# Patient Record
Sex: MALE | Race: ASIAN | NOT HISPANIC OR LATINO | Employment: FULL TIME | URBAN - METROPOLITAN AREA
[De-identification: names, ages, dates, MRNs, and addresses within clinical notes are randomized per-mention and may not be internally consistent; named-entity substitution may affect disease eponyms.]

---

## 2021-04-28 ENCOUNTER — APPOINTMENT (OUTPATIENT)
Dept: URGENT CARE | Facility: CLINIC | Age: 46
End: 2021-04-28

## 2021-04-28 DIAGNOSIS — Z02.1 PRE-EMPLOYMENT EXAMINATION: Primary | ICD-10-CM

## 2021-04-28 LAB — RUBV IGG SERPL IA-ACNC: 42.5 IU/ML

## 2021-04-28 PROCEDURE — 86765 RUBEOLA ANTIBODY: CPT | Performed by: FAMILY MEDICINE

## 2021-04-28 PROCEDURE — 86787 VARICELLA-ZOSTER ANTIBODY: CPT | Performed by: FAMILY MEDICINE

## 2021-04-28 PROCEDURE — 86762 RUBELLA ANTIBODY: CPT | Performed by: FAMILY MEDICINE

## 2021-04-28 PROCEDURE — 86735 MUMPS ANTIBODY: CPT | Performed by: FAMILY MEDICINE

## 2021-04-28 PROCEDURE — 86480 TB TEST CELL IMMUN MEASURE: CPT | Performed by: FAMILY MEDICINE

## 2021-04-29 LAB
MEV IGG SER QL: NORMAL
MUV IGG SER QL: NORMAL
VZV IGG SER IA-ACNC: NORMAL

## 2021-04-30 LAB
GAMMA INTERFERON BACKGROUND BLD IA-ACNC: 0.03 IU/ML
M TB IFN-G BLD-IMP: NEGATIVE
M TB IFN-G CD4+ BCKGRND COR BLD-ACNC: -0.01 IU/ML
M TB IFN-G CD4+ BCKGRND COR BLD-ACNC: 0 IU/ML
MITOGEN IGNF BCKGRD COR BLD-ACNC: >10 IU/ML

## 2021-09-10 ENCOUNTER — OFFICE VISIT (OUTPATIENT)
Dept: FAMILY MEDICINE CLINIC | Facility: HOSPITAL | Age: 46
End: 2021-09-10
Payer: COMMERCIAL

## 2021-09-10 ENCOUNTER — LAB (OUTPATIENT)
Dept: LAB | Facility: HOSPITAL | Age: 46
End: 2021-09-10
Payer: COMMERCIAL

## 2021-09-10 VITALS
HEIGHT: 71 IN | SYSTOLIC BLOOD PRESSURE: 118 MMHG | TEMPERATURE: 97.6 F | HEART RATE: 66 BPM | DIASTOLIC BLOOD PRESSURE: 80 MMHG | BODY MASS INDEX: 36.96 KG/M2 | WEIGHT: 264 LBS

## 2021-09-10 DIAGNOSIS — R73.03 PREDIABETES: ICD-10-CM

## 2021-09-10 DIAGNOSIS — M10.9 GOUT, UNSPECIFIED CAUSE, UNSPECIFIED CHRONICITY, UNSPECIFIED SITE: ICD-10-CM

## 2021-09-10 DIAGNOSIS — G47.33 OSA (OBSTRUCTIVE SLEEP APNEA): ICD-10-CM

## 2021-09-10 DIAGNOSIS — E78.00 HYPERCHOLESTEROLEMIA: ICD-10-CM

## 2021-09-10 DIAGNOSIS — Z00.00 ANNUAL PHYSICAL EXAM: Primary | ICD-10-CM

## 2021-09-10 DIAGNOSIS — Z23 ENCOUNTER FOR IMMUNIZATION: ICD-10-CM

## 2021-09-10 DIAGNOSIS — E66.01 CLASS 2 SEVERE OBESITY DUE TO EXCESS CALORIES WITH SERIOUS COMORBIDITY AND BODY MASS INDEX (BMI) OF 36.0 TO 36.9 IN ADULT (HCC): ICD-10-CM

## 2021-09-10 DIAGNOSIS — Z12.11 SCREEN FOR COLON CANCER: ICD-10-CM

## 2021-09-10 PROBLEM — I49.1 PREMATURE ATRIAL CONTRACTIONS: Status: ACTIVE | Noted: 2021-09-10

## 2021-09-10 PROBLEM — J30.2 SEASONAL ALLERGIES: Status: ACTIVE | Noted: 2021-09-10

## 2021-09-10 LAB
ALBUMIN SERPL BCP-MCNC: 3.9 G/DL (ref 3.5–5)
ALP SERPL-CCNC: 54 U/L (ref 46–116)
ALT SERPL W P-5'-P-CCNC: 58 U/L (ref 12–78)
ANION GAP SERPL CALCULATED.3IONS-SCNC: 3 MMOL/L (ref 4–13)
AST SERPL W P-5'-P-CCNC: 24 U/L (ref 5–45)
BILIRUB SERPL-MCNC: 0.71 MG/DL (ref 0.2–1)
BUN SERPL-MCNC: 16 MG/DL (ref 5–25)
CALCIUM SERPL-MCNC: 9.2 MG/DL (ref 8.3–10.1)
CHLORIDE SERPL-SCNC: 106 MMOL/L (ref 100–108)
CHOLEST SERPL-MCNC: 184 MG/DL (ref 50–200)
CO2 SERPL-SCNC: 31 MMOL/L (ref 21–32)
CREAT SERPL-MCNC: 1.19 MG/DL (ref 0.6–1.3)
ERYTHROCYTE [DISTWIDTH] IN BLOOD BY AUTOMATED COUNT: 12.4 % (ref 11.6–15.1)
EST. AVERAGE GLUCOSE BLD GHB EST-MCNC: 137 MG/DL
GFR SERPL CREATININE-BSD FRML MDRD: 72 ML/MIN/1.73SQ M
GLUCOSE P FAST SERPL-MCNC: 128 MG/DL (ref 65–99)
HBA1C MFR BLD: 6.4 %
HCT VFR BLD AUTO: 52.1 % (ref 36.5–49.3)
HDLC SERPL-MCNC: 38 MG/DL
HGB BLD-MCNC: 17.2 G/DL (ref 12–17)
LDLC SERPL CALC-MCNC: 88 MG/DL (ref 0–100)
MAGNESIUM SERPL-MCNC: 2.7 MG/DL (ref 1.6–2.6)
MCH RBC QN AUTO: 31.2 PG (ref 26.8–34.3)
MCHC RBC AUTO-ENTMCNC: 33 G/DL (ref 31.4–37.4)
MCV RBC AUTO: 94 FL (ref 82–98)
PLATELET # BLD AUTO: 281 THOUSANDS/UL (ref 149–390)
PMV BLD AUTO: 10 FL (ref 8.9–12.7)
POTASSIUM SERPL-SCNC: 4.3 MMOL/L (ref 3.5–5.3)
PROT SERPL-MCNC: 7.6 G/DL (ref 6.4–8.2)
RBC # BLD AUTO: 5.52 MILLION/UL (ref 3.88–5.62)
SODIUM SERPL-SCNC: 140 MMOL/L (ref 136–145)
TRIGL SERPL-MCNC: 290 MG/DL
TSH SERPL DL<=0.05 MIU/L-ACNC: 2.38 UIU/ML (ref 0.36–3.74)
URATE SERPL-MCNC: 6.7 MG/DL (ref 4.2–8)
WBC # BLD AUTO: 9.15 THOUSAND/UL (ref 4.31–10.16)

## 2021-09-10 PROCEDURE — 84550 ASSAY OF BLOOD/URIC ACID: CPT

## 2021-09-10 PROCEDURE — 83735 ASSAY OF MAGNESIUM: CPT

## 2021-09-10 PROCEDURE — 85027 COMPLETE CBC AUTOMATED: CPT

## 2021-09-10 PROCEDURE — 84443 ASSAY THYROID STIM HORMONE: CPT

## 2021-09-10 PROCEDURE — 90686 IIV4 VACC NO PRSV 0.5 ML IM: CPT

## 2021-09-10 PROCEDURE — 99396 PREV VISIT EST AGE 40-64: CPT | Performed by: FAMILY MEDICINE

## 2021-09-10 PROCEDURE — 80053 COMPREHEN METABOLIC PANEL: CPT

## 2021-09-10 PROCEDURE — 36415 COLL VENOUS BLD VENIPUNCTURE: CPT

## 2021-09-10 PROCEDURE — 83036 HEMOGLOBIN GLYCOSYLATED A1C: CPT

## 2021-09-10 PROCEDURE — 90471 IMMUNIZATION ADMIN: CPT

## 2021-09-10 PROCEDURE — 80061 LIPID PANEL: CPT

## 2021-09-10 RX ORDER — FLUTICASONE PROPIONATE 50 MCG
SPRAY, SUSPENSION (ML) NASAL
COMMUNITY

## 2021-09-10 RX ORDER — ALLOPURINOL 100 MG/1
TABLET ORAL
COMMUNITY
End: 2021-09-15

## 2021-09-10 RX ORDER — ATORVASTATIN CALCIUM 10 MG/1
TABLET, FILM COATED ORAL
COMMUNITY
End: 2021-09-15

## 2021-09-10 RX ORDER — CETIRIZINE HYDROCHLORIDE 10 MG/1
TABLET ORAL
COMMUNITY

## 2021-09-10 RX ORDER — PREDNISONE 10 MG/1
TABLET ORAL
Qty: 48 TABLET | Refills: 1 | Status: SHIPPED | OUTPATIENT
Start: 2021-09-10 | End: 2022-01-26

## 2021-09-10 NOTE — PATIENT INSTRUCTIONS

## 2021-09-10 NOTE — PROGRESS NOTES
[facility] [address]     NAME: Porter Boss  AGE: 48 y o  SEX: male  : 1975     DATE: 9/10/2021     Assessment and Plan:     Problem List Items Addressed This Visit        Other    Gout    Relevant Medications    predniSONE 10 mg tablet    Other Relevant Orders    Uric acid    TSH, 3rd generation with Free T4 reflex    Hypercholesterolemia    Relevant Medications    atorvastatin (LIPITOR) 10 mg tablet    Other Relevant Orders    Lipid Panel with Direct LDL reflex    Magnesium    TSH, 3rd generation with Free T4 reflex      Other Visit Diagnoses     Annual physical exam    -  Primary    RADHA (obstructive sleep apnea)        Prediabetes        Relevant Orders    CBC    Comprehensive metabolic panel    Hemoglobin A1C    Class 2 severe obesity due to excess calories with serious comorbidity and body mass index (BMI) of 36 0 to 36 9 in [facility])          Obstructive sleep apnea  Currently on CPAP at 12 cm  He will be reaching out to sleep specialist for ongoing care  Hyperlipidemia  On Lipitor  Check labs and adjust if needed  Gout occasional flare-ups  Check uric acid  Continue with NSAIDs as needed  Pre diabetes  Check labs  Work on dietary control and exercise  Immunizations and preventive care screenings were discussed with patient today  Appropriate education was printed on patient's after visit summary  Counseling:  Alcohol/drug use: discussed moderation in alcohol intake, the recommendations for healthy alcohol use, and avoidance of illicit drug use  Dental Health: discussed importance of regular tooth brushing, flossing, and dental visits  · Exercise: the importance of regular exercise/physical activity was discussed  Recommend exercise 3-5 times per week for at least 30 minutes  BMI Counseling: Body mass index is 36 82 kg/m²   The BMI is above normal  Nutrition recommendations include decreasing portion sizes, encouraging healthy choices of fruits and vegetables, decreasing fast food intake, moderation in carbohydrate intake and increasing intake of lean protein  Exercise recommendations include moderate physical activity 150 minutes/week and exercising 3-5 times per week  No follow-ups on file  Chief Complaint:     Chief Complaint   Patient presents with   [facility] Annual Exam      History of Present Illness:     Adult Annual Physical   Patient here for a comprehensive physical exam  The patient reports with gout, sravan, ifg, hld  Has been stable  Has not had recent labs  But feeling well       Diet and Physical Activity  · Diet/Nutrition: well balanced diet  · Exercise: no formal exercise  Depression Screening  PHQ-9 Depression Screening    PHQ-9:   Frequency of the following problems over the past two weeks:      Little interest or pleasure in doing things: 0 - not at all  Feeling down, depressed, or hopeless: 0 - not at all  PHQ-2 Score: 0       General Health  · Sleep: with cpap  · Hearing: normal - bilateral   · Vision: no vision problems  · Dental: regular dental visits   Health  · Symptoms include: none     Review of Systems:     Review of Systems   Constitutional: Negative  Negative for activity change, appetite change, chills and diaphoresis  HENT: Negative for congestion and dental problem  Respiratory: Negative  Negative for apnea, chest tightness, shortness of breath and wheezing  Cardiovascular: Negative  Negative for chest pain, palpitations and leg swelling  Gastrointestinal: Negative  Negative for abdominal distention, abdominal pain, constipation, diarrhea and nausea  Genitourinary: Negative  Negative for difficulty urinating, dysuria and frequency  Past Medical History:     History reviewed  No pertinent past medical history  Past Surgical History:     History reviewed  No pertinent surgical history  Family History:     History reviewed  No pertinent family history  Social History:     Social History     Socioeconomic History    Marital status: /Civil Union     Spouse name: None    Number of children: None    Years of education: None    Highest education level: None   Occupational History    None   Tobacco Use    Smoking status: Never Smoker    Smokeless tobacco: Never Used   Vaping Use    Vaping Use: Never used   Substance and Sexual Activity    Alcohol use: Never    Drug use: Never    Sexual activity: None   Other Topics Concern    None   Social History Narrative    None     Social Determinants of Health     Financial Resource Strain:     Difficulty of Paying Living Expenses:    Food Insecurity:     Worried About Running Out of Food in the Last Year:     Ran Out of Food in the Last Year:    Transportation Needs:     Lack of Transportation (Medical):      Lack of Transportation (Non-Medical):    Physical Activity:     Days of Exercise per Week:     Minutes of Exercise per Session:    Stress:     Feeling of Stress :    Social Connections:     Frequency of Communication with Friends and Family:     Frequency of Social Gatherings with Friends and Family:     Attends Muslim Services:     Active Member of Clubs or Organizations:     Attends Club or Organization Meetings:     Marital Status:    Intimate Partner Violence:     Fear of Current or Ex-Partner:     Emotionally Abused:     Physically Abused:     Sexually Abused:       Current Medications:     Current Outpatient Medications   Medication Sig Dispense Refill    allopurinol (ZYLOPRIM) 100 mg tablet       ASPIRIN 81 PO       atorvastatin (LIPITOR) 10 mg tablet       cetirizine (ZyrTEC Allergy) 10 mg tablet       fluticasone (Flonase) 50 mcg/act nasal spray       Multiple Vitamins-Minerals (MULTIVITAMIN MEN PO)       predniSONE 10 mg tablet Take 6 tab po x 3 d, 4 tab po x 3 d, 3 tab po x 3 d, 2 tab po x 3 d, 1 tab po x 3 d 48 tablet 1     No current facility-administered medications for this visit  Allergies:     No Known Allergies   Physical Exam:     /80   Pulse 66   Temp 97 6 °F (36 4 °C)   Ht 5' 11" (1 803 m)   Wt 120 kg (264 lb)   BMI 36 82 kg/m²     Physical Exam  Vitals and nursing note reviewed  Constitutional:       General: He is not in acute distress  Appearance: Normal appearance  He is well-developed  He is obese  He is not ill-appearing  HENT:      Head: Normocephalic and atraumatic  Right Ear: Tympanic membrane, ear canal and external ear normal       Left Ear: Tympanic membrane, ear canal and external ear normal       Mouth/Throat:      Mouth: Mucous membranes are moist       Pharynx: Oropharynx is clear  Eyes:      Extraocular Movements: Extraocular movements intact  Conjunctiva/sclera: Conjunctivae normal       Pupils: Pupils are equal, round, and reactive to light  Cardiovascular:      Rate and Rhythm: Normal rate and regular rhythm  Heart sounds: Normal heart sounds  No murmur heard  Pulmonary:      Effort: Pulmonary effort is normal       Breath sounds: Normal breath sounds  Abdominal:      General: Bowel sounds are normal  There is no distension  Palpations: Abdomen is soft  There is no mass  Tenderness: There is no abdominal tenderness  There is no guarding  Hernia: No hernia is present  Genitourinary:     Penis: Normal     Musculoskeletal:         General: Normal range of motion  Cervical back: Normal range of motion and neck supple  Skin:     General: Skin is warm and dry  Capillary Refill: Capillary refill takes less than 2 seconds  Neurological:      General: No focal deficit present  Mental Status: He is alert and oriented to person, place, and time  Psychiatric:         Mood and Affect: Mood normal          Behavior: Behavior normal          Thought Content:  Thought content normal          Judgment: Judgment lilliana Rowan MD  [address] [id number]

## 2021-09-13 RX ORDER — ATORVASTATIN CALCIUM 10 MG/1
10 TABLET, FILM COATED ORAL DAILY
Qty: 90 TABLET | Status: CANCELLED | OUTPATIENT
Start: 2021-09-13

## 2021-09-13 RX ORDER — ALLOPURINOL 100 MG/1
100 TABLET ORAL DAILY
Qty: 90 TABLET | Status: CANCELLED | OUTPATIENT
Start: 2021-09-13

## 2021-09-15 DIAGNOSIS — M10.9 GOUT, UNSPECIFIED CAUSE, UNSPECIFIED CHRONICITY, UNSPECIFIED SITE: Primary | ICD-10-CM

## 2021-09-15 DIAGNOSIS — E78.00 HYPERCHOLESTEROLEMIA: ICD-10-CM

## 2021-09-15 RX ORDER — ATORVASTATIN CALCIUM 10 MG/1
10 TABLET, FILM COATED ORAL DAILY
Qty: 90 TABLET | Refills: 3 | Status: SHIPPED | OUTPATIENT
Start: 2021-09-15 | End: 2022-04-06

## 2021-09-15 RX ORDER — ALLOPURINOL 100 MG/1
100 TABLET ORAL DAILY
Qty: 90 TABLET | Refills: 3 | Status: SHIPPED | OUTPATIENT
Start: 2021-09-15 | End: 2022-07-12 | Stop reason: SDUPTHER

## 2021-10-26 LAB — COLOGUARD RESULT REPORTABLE: NEGATIVE

## 2021-12-01 ENCOUNTER — TELEPHONE (OUTPATIENT)
Dept: FAMILY MEDICINE CLINIC | Facility: HOSPITAL | Age: 46
End: 2021-12-01

## 2021-12-01 DIAGNOSIS — B34.9 VIRAL INFECTION, UNSPECIFIED: Primary | ICD-10-CM

## 2021-12-01 PROCEDURE — U0005 INFEC AGEN DETEC AMPLI PROBE: HCPCS | Performed by: FAMILY MEDICINE

## 2021-12-01 PROCEDURE — U0003 INFECTIOUS AGENT DETECTION BY NUCLEIC ACID (DNA OR RNA); SEVERE ACUTE RESPIRATORY SYNDROME CORONAVIRUS 2 (SARS-COV-2) (CORONAVIRUS DISEASE [COVID-19]), AMPLIFIED PROBE TECHNIQUE, MAKING USE OF HIGH THROUGHPUT TECHNOLOGIES AS DESCRIBED BY CMS-2020-01-R: HCPCS | Performed by: FAMILY MEDICINE

## 2021-12-22 ENCOUNTER — IMMUNIZATIONS (OUTPATIENT)
Dept: FAMILY MEDICINE CLINIC | Facility: HOSPITAL | Age: 46
End: 2021-12-22

## 2021-12-22 DIAGNOSIS — Z23 ENCOUNTER FOR IMMUNIZATION: Primary | ICD-10-CM

## 2021-12-22 PROCEDURE — 91300 COVID-19 PFIZER VACC 0.3 ML: CPT

## 2021-12-22 PROCEDURE — 0001A COVID-19 PFIZER VACC 0.3 ML: CPT

## 2022-01-26 ENCOUNTER — PATIENT MESSAGE (OUTPATIENT)
Dept: FAMILY MEDICINE CLINIC | Facility: HOSPITAL | Age: 47
End: 2022-01-26

## 2022-01-26 DIAGNOSIS — M10.9 GOUT, UNSPECIFIED CAUSE, UNSPECIFIED CHRONICITY, UNSPECIFIED SITE: Primary | ICD-10-CM

## 2022-01-26 RX ORDER — PREDNISONE 10 MG/1
TABLET ORAL
Qty: 21 TABLET | Refills: 3 | Status: SHIPPED | OUTPATIENT
Start: 2022-01-26 | End: 2022-07-20 | Stop reason: SDUPTHER

## 2022-01-26 NOTE — TELEPHONE ENCOUNTER
From: Esther Erwin  To: Francesco Powell MD  Sent: 1/26/2022 12:10 PM EST  Subject: gout flareup    Hi! Just an FYI     for an unknown reason I've been experiencing a gout flareup, and have started the prednisone taper (which you provided at my last Clinic visit) along with scheduled Aleve  Has been improving, and no side effects with the meds  Last time I experienced a flareup may have been past spring-summer  At your convenience, would you be able to provide a refill for the prednisone, in preparation for a future flareup (which I'm hoping will never come! )? Thanks!

## 2022-03-11 ENCOUNTER — LAB (OUTPATIENT)
Dept: LAB | Facility: HOSPITAL | Age: 47
End: 2022-03-11
Payer: COMMERCIAL

## 2022-03-11 ENCOUNTER — OFFICE VISIT (OUTPATIENT)
Dept: FAMILY MEDICINE CLINIC | Facility: HOSPITAL | Age: 47
End: 2022-03-11
Payer: COMMERCIAL

## 2022-03-11 VITALS
WEIGHT: 262.4 LBS | HEART RATE: 76 BPM | DIASTOLIC BLOOD PRESSURE: 88 MMHG | HEIGHT: 71 IN | BODY MASS INDEX: 36.73 KG/M2 | SYSTOLIC BLOOD PRESSURE: 118 MMHG | TEMPERATURE: 98 F

## 2022-03-11 DIAGNOSIS — E78.00 HYPERCHOLESTEROLEMIA: ICD-10-CM

## 2022-03-11 DIAGNOSIS — R73.01 IMPAIRED FASTING GLUCOSE: ICD-10-CM

## 2022-03-11 DIAGNOSIS — R73.01 IMPAIRED FASTING GLUCOSE: Primary | ICD-10-CM

## 2022-03-11 DIAGNOSIS — M79.10 MYALGIA: ICD-10-CM

## 2022-03-11 DIAGNOSIS — G47.33 OSA (OBSTRUCTIVE SLEEP APNEA): ICD-10-CM

## 2022-03-11 DIAGNOSIS — M10.9 GOUT, UNSPECIFIED CAUSE, UNSPECIFIED CHRONICITY, UNSPECIFIED SITE: ICD-10-CM

## 2022-03-11 LAB
ALBUMIN SERPL BCP-MCNC: 4.3 G/DL (ref 3.5–5)
ALP SERPL-CCNC: 57 U/L (ref 46–116)
ALT SERPL W P-5'-P-CCNC: 75 U/L (ref 12–78)
ANION GAP SERPL CALCULATED.3IONS-SCNC: 4 MMOL/L (ref 4–13)
AST SERPL W P-5'-P-CCNC: 30 U/L (ref 5–45)
BILIRUB SERPL-MCNC: 0.58 MG/DL (ref 0.2–1)
BUN SERPL-MCNC: 15 MG/DL (ref 5–25)
CALCIUM SERPL-MCNC: 9.4 MG/DL (ref 8.3–10.1)
CHLORIDE SERPL-SCNC: 107 MMOL/L (ref 100–108)
CHOLEST SERPL-MCNC: 179 MG/DL
CK MB SERPL-MCNC: <1 % (ref 0–2.5)
CK MB SERPL-MCNC: <1 NG/ML (ref 0–5)
CK SERPL-CCNC: 213 U/L (ref 39–308)
CO2 SERPL-SCNC: 29 MMOL/L (ref 21–32)
CREAT SERPL-MCNC: 1.24 MG/DL (ref 0.6–1.3)
ERYTHROCYTE [DISTWIDTH] IN BLOOD BY AUTOMATED COUNT: 12.4 % (ref 11.6–15.1)
EST. AVERAGE GLUCOSE BLD GHB EST-MCNC: 174 MG/DL
GFR SERPL CREATININE-BSD FRML MDRD: 68 ML/MIN/1.73SQ M
GLUCOSE P FAST SERPL-MCNC: 121 MG/DL (ref 65–99)
HBA1C MFR BLD: 7.7 %
HCT VFR BLD AUTO: 47.9 % (ref 36.5–49.3)
HDLC SERPL-MCNC: 37 MG/DL
HGB BLD-MCNC: 16.8 G/DL (ref 12–17)
LDLC SERPL CALC-MCNC: 90 MG/DL (ref 0–100)
MCH RBC QN AUTO: 30.9 PG (ref 26.8–34.3)
MCHC RBC AUTO-ENTMCNC: 35.1 G/DL (ref 31.4–37.4)
MCV RBC AUTO: 88 FL (ref 82–98)
PLATELET # BLD AUTO: 276 THOUSANDS/UL (ref 149–390)
PMV BLD AUTO: 10.6 FL (ref 8.9–12.7)
POTASSIUM SERPL-SCNC: 3.8 MMOL/L (ref 3.5–5.3)
PROT SERPL-MCNC: 8 G/DL (ref 6.4–8.2)
RBC # BLD AUTO: 5.43 MILLION/UL (ref 3.88–5.62)
SODIUM SERPL-SCNC: 140 MMOL/L (ref 136–145)
TRIGL SERPL-MCNC: 262 MG/DL
WBC # BLD AUTO: 6.37 THOUSAND/UL (ref 4.31–10.16)

## 2022-03-11 PROCEDURE — 80053 COMPREHEN METABOLIC PANEL: CPT

## 2022-03-11 PROCEDURE — 80061 LIPID PANEL: CPT

## 2022-03-11 PROCEDURE — 82553 CREATINE MB FRACTION: CPT

## 2022-03-11 PROCEDURE — 99214 OFFICE O/P EST MOD 30 MIN: CPT | Performed by: FAMILY MEDICINE

## 2022-03-11 PROCEDURE — 36415 COLL VENOUS BLD VENIPUNCTURE: CPT

## 2022-03-11 PROCEDURE — 83036 HEMOGLOBIN GLYCOSYLATED A1C: CPT

## 2022-03-11 PROCEDURE — 85027 COMPLETE CBC AUTOMATED: CPT

## 2022-03-11 PROCEDURE — 82550 ASSAY OF CK (CPK): CPT

## 2022-03-11 NOTE — PROGRESS NOTES
Assessment/Plan:      Problem List Items Addressed This Visit        Other    Gout    Hypercholesterolemia      Other Visit Diagnoses     Impaired fasting glucose    -  Primary    Myalgia               Plan/Discussion:  hld with Myalgia  Likely due to statin  Hold stain x 2 weeks and monitor  If improved will challenge with less potent statin  [address] labs  Work on dietary control and exercise  Luke  Stable  Continue with autopap  Gout  No acute attacks  Fu in 6 months  Subjective:   Chief Complaint   Patient presents with    Follow-up     6 month         Patient ID: Wyatt Gomez is a 48 y o  male  Here for fu of chronic condtions  No new concerns except noting some muscle aches  Hips and thighs bilaterally  Concern for statin side effect  Otherwise he has been doing well  The following portions of the patient's history were reviewed and updated as appropriate: allergies, current medications, past family history, past medical history, past social history, past surgical history and problem list     Review of Systems   Constitutional: Negative  Negative for activity change, appetite change, chills and diaphoresis  HENT: Negative for congestion and dental problem  Respiratory: Negative  Negative for apnea, chest tightness, shortness of breath and wheezing  Cardiovascular: Negative  Negative for chest pain, palpitations and leg swelling  Gastrointestinal: Negative  Negative for abdominal distention, abdominal pain, constipation, diarrhea and nausea  Genitourinary: Negative  Negative for difficulty urinating, dysuria and frequency           Objective:  Vitals:    03/11/22 1333   BP: 118/88   Pulse: 76   Temp: 98 °F (36 7 °C)   Weight: 119 kg (262 lb 6 4 oz)   Height: 5' 11" (1 803 m)     BP Readings from Last 6 Encounters:   03/11/22 118/88   09/10/21 118/80      Wt Readings from Last 6 Encounters:   03/11/22 119 kg (262 lb 6 4 oz) 09/10/21 120 kg (264 lb)             Physical Exam  Vitals and nursing note reviewed  Constitutional:       General: He is not in acute distress  Appearance: He is well-developed  He is not ill-appearing  HENT:      Head: Normocephalic and atraumatic  Right Ear: External ear normal       Left Ear: External ear normal       Nose: Nose normal  No congestion or rhinorrhea  Mouth/Throat:      Mouth: Mucous membranes are moist       Pharynx: No oropharyngeal exudate or posterior oropharyngeal erythema  Eyes:      Extraocular Movements: Extraocular movements intact  Conjunctiva/sclera: Conjunctivae normal       Pupils: Pupils are equal, round, and reactive to light  Cardiovascular:      Rate and Rhythm: Normal rate and regular rhythm  Heart sounds: Normal heart sounds  No murmur heard  No friction rub  No gallop  Pulmonary:      Effort: Pulmonary effort is normal  No respiratory distress  Breath sounds: Normal breath sounds  No wheezing or rales  Chest:      Chest wall: No tenderness  Abdominal:      General: Bowel sounds are normal  There is no distension  Palpations: Abdomen is soft  There is no mass  Tenderness: There is no abdominal tenderness  There is no guarding or rebound  Musculoskeletal:         General: Normal range of motion  Cervical back: Normal range of motion and neck supple  Skin:     General: Skin is warm  Capillary Refill: Capillary refill takes less than 2 seconds  Neurological:      Mental Status: He is alert and oriented to person, place, and time     Psychiatric:         Mood and Affect: Mood normal          Behavior: Behavior normal

## 2022-04-06 DIAGNOSIS — E78.00 HYPERCHOLESTEROLEMIA: Primary | ICD-10-CM

## 2022-04-06 RX ORDER — PRAVASTATIN SODIUM 10 MG
10 TABLET ORAL
Qty: 90 TABLET | Refills: 0 | Status: SHIPPED | OUTPATIENT
Start: 2022-04-06 | End: 2022-06-29 | Stop reason: SDUPTHER

## 2022-04-22 ENCOUNTER — HOSPITAL ENCOUNTER (EMERGENCY)
Facility: HOSPITAL | Age: 47
Discharge: HOME | End: 2022-04-23
Attending: EMERGENCY MEDICINE | Admitting: EMERGENCY MEDICINE
Payer: COMMERCIAL

## 2022-04-22 DIAGNOSIS — E27.8 ADRENAL NODULE (HCC): ICD-10-CM

## 2022-04-22 DIAGNOSIS — R10.9 ABDOMINAL PAIN: ICD-10-CM

## 2022-04-22 DIAGNOSIS — R11.2 NAUSEA AND VOMITING, UNSPECIFIED VOMITING TYPE: ICD-10-CM

## 2022-04-22 DIAGNOSIS — N20.0 NEPHROLITHIASIS: Primary | ICD-10-CM

## 2022-04-22 PROCEDURE — 99284 EMERGENCY DEPT VISIT MOD MDM: CPT

## 2022-04-23 ENCOUNTER — APPOINTMENT (EMERGENCY)
Dept: RADIOLOGY | Facility: HOSPITAL | Age: 47
End: 2022-04-23
Payer: COMMERCIAL

## 2022-04-23 VITALS
TEMPERATURE: 98.2 F | BODY MASS INDEX: 35 KG/M2 | WEIGHT: 250 LBS | SYSTOLIC BLOOD PRESSURE: 130 MMHG | DIASTOLIC BLOOD PRESSURE: 63 MMHG | OXYGEN SATURATION: 99 % | HEART RATE: 80 BPM | RESPIRATION RATE: 16 BRPM | HEIGHT: 71 IN

## 2022-04-23 LAB
ALBUMIN SERPL BCP-MCNC: 4 G/DL (ref 3.5–5)
ALP SERPL-CCNC: 52 U/L (ref 46–116)
ALT SERPL W P-5'-P-CCNC: 83 U/L (ref 12–78)
ANION GAP SERPL CALCULATED.3IONS-SCNC: 9 MMOL/L (ref 4–13)
AST SERPL W P-5'-P-CCNC: 38 U/L (ref 5–45)
BASOPHILS # BLD AUTO: 0.08 THOUSANDS/ΜL (ref 0–0.1)
BASOPHILS NFR BLD AUTO: 1 % (ref 0–1)
BILIRUB DIRECT SERPL-MCNC: 0.06 MG/DL (ref 0–0.2)
BILIRUB SERPL-MCNC: 0.36 MG/DL (ref 0.2–1)
BUN SERPL-MCNC: 16 MG/DL (ref 5–25)
CALCIUM SERPL-MCNC: 9 MG/DL (ref 8.3–10.1)
CHLORIDE SERPL-SCNC: 105 MMOL/L (ref 100–108)
CO2 SERPL-SCNC: 25 MMOL/L (ref 21–32)
CREAT SERPL-MCNC: 1.31 MG/DL (ref 0.6–1.3)
EOSINOPHIL # BLD AUTO: 0.38 THOUSAND/ΜL (ref 0–0.61)
EOSINOPHIL NFR BLD AUTO: 4 % (ref 0–6)
ERYTHROCYTE [DISTWIDTH] IN BLOOD BY AUTOMATED COUNT: 12.4 % (ref 11.6–15.1)
GFR SERPL CREATININE-BSD FRML MDRD: 63 ML/MIN/1.73SQ M
GLUCOSE SERPL-MCNC: 170 MG/DL (ref 65–140)
HCT VFR BLD AUTO: 48.5 % (ref 36.5–49.3)
HGB BLD-MCNC: 16.3 G/DL (ref 12–17)
IMM GRANULOCYTES # BLD AUTO: 0.07 THOUSAND/UL (ref 0–0.2)
IMM GRANULOCYTES NFR BLD AUTO: 1 % (ref 0–2)
LIPASE SERPL-CCNC: 152 U/L (ref 73–393)
LYMPHOCYTES # BLD AUTO: 3.02 THOUSANDS/ΜL (ref 0.6–4.47)
LYMPHOCYTES NFR BLD AUTO: 34 % (ref 14–44)
MCH RBC QN AUTO: 30.9 PG (ref 26.8–34.3)
MCHC RBC AUTO-ENTMCNC: 33.6 G/DL (ref 31.4–37.4)
MCV RBC AUTO: 92 FL (ref 82–98)
MONOCYTES # BLD AUTO: 1.11 THOUSAND/ΜL (ref 0.17–1.22)
MONOCYTES NFR BLD AUTO: 13 % (ref 4–12)
NEUTROPHILS # BLD AUTO: 4.15 THOUSANDS/ΜL (ref 1.85–7.62)
NEUTS SEG NFR BLD AUTO: 47 % (ref 43–75)
NRBC BLD AUTO-RTO: 0 /100 WBCS
PLATELET # BLD AUTO: 254 THOUSANDS/UL (ref 149–390)
PMV BLD AUTO: 11 FL (ref 8.9–12.7)
POTASSIUM SERPL-SCNC: 4 MMOL/L (ref 3.5–5.3)
PROT SERPL-MCNC: 7.6 G/DL (ref 6.4–8.2)
RBC # BLD AUTO: 5.27 MILLION/UL (ref 3.88–5.62)
SODIUM SERPL-SCNC: 139 MMOL/L (ref 136–145)
WBC # BLD AUTO: 8.81 THOUSAND/UL (ref 4.31–10.16)

## 2022-04-23 PROCEDURE — 80076 HEPATIC FUNCTION PANEL: CPT | Performed by: EMERGENCY MEDICINE

## 2022-04-23 PROCEDURE — G1004 CDSM NDSC: HCPCS

## 2022-04-23 PROCEDURE — 99285 EMERGENCY DEPT VISIT HI MDM: CPT | Performed by: EMERGENCY MEDICINE

## 2022-04-23 PROCEDURE — 96374 THER/PROPH/DIAG INJ IV PUSH: CPT

## 2022-04-23 PROCEDURE — 96375 TX/PRO/DX INJ NEW DRUG ADDON: CPT

## 2022-04-23 PROCEDURE — 85025 COMPLETE CBC W/AUTO DIFF WBC: CPT | Performed by: EMERGENCY MEDICINE

## 2022-04-23 PROCEDURE — 96376 TX/PRO/DX INJ SAME DRUG ADON: CPT

## 2022-04-23 PROCEDURE — 74177 CT ABD & PELVIS W/CONTRAST: CPT

## 2022-04-23 PROCEDURE — 83690 ASSAY OF LIPASE: CPT | Performed by: EMERGENCY MEDICINE

## 2022-04-23 PROCEDURE — 36415 COLL VENOUS BLD VENIPUNCTURE: CPT | Performed by: EMERGENCY MEDICINE

## 2022-04-23 PROCEDURE — 80048 BASIC METABOLIC PNL TOTAL CA: CPT | Performed by: EMERGENCY MEDICINE

## 2022-04-23 RX ORDER — MORPHINE SULFATE 10 MG/ML
8 INJECTION, SOLUTION INTRAMUSCULAR; INTRAVENOUS ONCE
Status: COMPLETED | OUTPATIENT
Start: 2022-04-23 | End: 2022-04-23

## 2022-04-23 RX ORDER — OXYCODONE HYDROCHLORIDE 5 MG/1
5 TABLET ORAL EVERY 4 HOURS PRN
Qty: 15 TABLET | Refills: 0 | Status: SHIPPED | OUTPATIENT
Start: 2022-04-23

## 2022-04-23 RX ORDER — KETOROLAC TROMETHAMINE 30 MG/ML
15 INJECTION, SOLUTION INTRAMUSCULAR; INTRAVENOUS ONCE
Status: COMPLETED | OUTPATIENT
Start: 2022-04-23 | End: 2022-04-23

## 2022-04-23 RX ADMIN — KETOROLAC TROMETHAMINE 15 MG: 30 INJECTION, SOLUTION INTRAMUSCULAR at 02:09

## 2022-04-23 RX ADMIN — IOHEXOL 100 ML: 350 INJECTION, SOLUTION INTRAVENOUS at 01:46

## 2022-04-23 RX ADMIN — MORPHINE SULFATE 8 MG: 10 INJECTION INTRAVENOUS at 02:09

## 2022-04-23 RX ADMIN — MORPHINE SULFATE 8 MG: 10 INJECTION INTRAVENOUS at 00:52

## 2022-04-23 NOTE — ED ATTENDING ATTESTATION
4/22/2022  I, Richard Ellis MD, saw and evaluated the patient  I have discussed the patient with the resident/non-physician practitioner and agree with the resident's/non-physician practitioner's findings, Plan of Care, and MDM as documented in the resident's/non-physician practitioner's note, except where noted  All available labs and Radiology studies were reviewed  I was present for key portions of any procedure(s) performed by the resident/non-physician practitioner and I was immediately available to provide assistance  At this point I agree with the current assessment done in the Emergency Department  I have conducted an independent evaluation of this patient a history and physical is as follows:    ED Course     53-year-old male, history of renal stones, last approximately 2 years ago, requiring stent, presenting to the emergency department with fairly abrupt onset right-sided lower quadrant abdominal pain  Waxing and waning  Some radiation into the right testicle  No urinary symptoms  No fever  No nausea or vomiting  No flank pain  Ten systems reviewed and negative except as noted  On examination patient is lying recumbent in the stretcher no acute respiratory distress  Head is normocephalic and atraumatic  Eyelids lashes normal   Mucous membranes are moist   Neck is supple  Lungs are clear to auscultation bilaterally  Heart is regular rate rhythm with no murmurs rubs or gallops  Abdomen is nondistended, soft, with mild tenderness to palpation in the right lower quadrant  Extremities unremarkable      Labs Reviewed   CBC AND DIFFERENTIAL - Abnormal       Result Value Ref Range Status    WBC 8 81  4 31 - 10 16 Thousand/uL Final    RBC 5 27  3 88 - 5 62 Million/uL Final    Hemoglobin 16 3  12 0 - 17 0 g/dL Final    Hematocrit 48 5  36 5 - 49 3 % Final    MCV 92  82 - 98 fL Final    MCH 30 9  26 8 - 34 3 pg Final    MCHC 33 6  31 4 - 37 4 g/dL Final    RDW 12 4  11 6 - 15 1 % Final MPV 11 0  8 9 - 12 7 fL Final    Platelets 157  200 - 390 Thousands/uL Final    nRBC 0  /100 WBCs Final    Neutrophils Relative 47  43 - 75 % Final    Immat GRANS % 1  0 - 2 % Final    Lymphocytes Relative 34  14 - 44 % Final    Monocytes Relative 13 (*) 4 - 12 % Final    Eosinophils Relative 4  0 - 6 % Final    Basophils Relative 1  0 - 1 % Final    Neutrophils Absolute 4 15  1 85 - 7 62 Thousands/µL Final    Immature Grans Absolute 0 07  0 00 - 0 20 Thousand/uL Final    Lymphocytes Absolute 3 02  0 60 - 4 47 Thousands/µL Final    Monocytes Absolute 1 11  0 17 - 1 22 Thousand/µL Final    Eosinophils Absolute 0 38  0 00 - 0 61 Thousand/µL Final    Basophils Absolute 0 08  0 00 - 0 10 Thousands/µL Final   BASIC METABOLIC PANEL - Abnormal    Sodium 139  136 - 145 mmol/L Final    Potassium 4 0  3 5 - 5 3 mmol/L Final    Comment: Slightly Hemolyzed; Results May be Affected    Chloride 105  100 - 108 mmol/L Final    CO2 25  21 - 32 mmol/L Final    ANION GAP 9  4 - 13 mmol/L Final    BUN 16  5 - 25 mg/dL Final    Creatinine 1 31 (*) 0 60 - 1 30 mg/dL Final    Comment: Standardized to IDMS reference method    Glucose 170 (*) 65 - 140 mg/dL Final    Comment: If the patient is fasting, the ADA then defines impaired fasting glucose as > 100 mg/dL and diabetes as > or equal to 123 mg/dL  Specimen collection should occur prior to Sulfasalazine administration due to the potential for falsely depressed results  Specimen collection should occur prior to Sulfapyridine administration due to the potential for falsely elevated results      Calcium 9 0  8 3 - 10 1 mg/dL Final    eGFR 63  ml/min/1 73sq m Final    Narrative:     [place] guidelines for Chronic Kidney Disease (CKD):     Stage 1 with normal or high GFR (GFR > 90 mL/min/1 73 square meters)    Stage 2 Mild CKD (GFR = 60-89 mL/min/1 73 square meters)    Stage 3A Moderate CKD (GFR = 45-59 mL/min/1 73 square meters)    Stage 3B Moderate CKD (GFR = 30-44 mL/min/1 73 square meters)    Stage 4 Severe CKD (GFR = 15-29 mL/min/1 73 square meters)    Stage 5 End Stage CKD (GFR <15 mL/min/1 73 square meters)  Note: GFR calculation is accurate only with a steady state creatinine   HEPATIC FUNCTION PANEL - Abnormal    Total Bilirubin 0 36  0 20 - 1 00 mg/dL Final    Comment: Use of this assay is not recommended for patients undergoing treatment with eltrombopag due to the potential for falsely elevated results  Bilirubin, Direct 0 06  0 00 - 0 20 mg/dL Final    Alkaline Phosphatase 52  46 - 116 U/L Final    AST 38  5 - 45 U/L Final    Comment: Slightly Hemolyzed; Results May be Affected  Specimen collection should occur prior to Sulfasalazine and/or Sulfapyridine administration due to the potential for falsely depressed results  ALT 83 (*) 12 - 78 U/L Final    Comment: Specimen collection should occur prior to Sulfasalazine and/or Sulfapyridine administration due to the potential for falsely depressed results  Total Protein 7 6  6 4 - 8 2 g/dL Final    Albumin 4 0  3 5 - 5 0 g/dL Final   LIPASE - Normal    Lipase 152  73 - 393 u/L Final   URINALYSIS WITH REFLEX TO SCOPE       CT abdomen pelvis with contrast   Final Result      Right hydroureter secondary to a 2 mm obstructing stone at the distal right ureter just proximal to the ureterovesical junction        1 9 cm right adrenal nodule for which non-contrast abdomen CT or MRI in 12 months recommended      Workstation performed: [id number]                 Critical Care Time  Procedures

## 2022-04-23 NOTE — DISCHARGE INSTRUCTIONS
You were seen today in the Emergency Department  Please follow up by calling Urology in the next 1-2 days to recheck your symptoms and to follow up on your visit to the Emergency Department today  PLEASE DISCUSS THE ADRENAL NODULE NOTED ON YOUR CT SCAN WITH YOUR PRIMARY CARE PROVIDER IN THE NEXT 3-4 DAYS AS IT MAY NEED FOLLOW UP IMAGING  Please return to the Emergency Department if you have worsening pain, difficulty with urination, fevers, chills, chest pain, shortness of breath, are unable to eat or drink, or have any other symptoms that concern you  Please look this over and let your nurse and/or me know if you have any further questions before you leave

## 2022-04-23 NOTE — ED NOTES
Provider aware that pt is requesting additional pain medication          Ramana Lewis RN  04/23/22 [id number]

## 2022-04-23 NOTE — ED PROVIDER NOTES
History  Chief Complaint   Patient presents with    Abdominal Pain     pt reports RLQ abd pain that started around 11pm pt reports N/V      Esa Ladd is an 50y o  year old male with PMHx significant for HLD, nephrolithiasis, gout, who presents to the ED today with RLQ pain for 1 5 hours  Abdominal pain is exacerbated by nothing and relieved by nothing  The pain is colicky in quality, does not radiate, and currently rated moderate in severity  Has tried nothing for pain  Patient also endorses nausea and minimal vomiting  The patient denies fevers, chills, headaches, lightheadedness or syncope, chest pain, shortness of breath, cough, changes in usual bowel movements, changes with urination, pain anywhere else in body  ROS otherwise negative  History provided by:  Medical records and patient   used: No    Abdominal Pain      Prior to Admission Medications   Prescriptions Last Dose Informant Patient Reported? Taking?    ASPIRIN 81 PO   Yes No   Multiple Vitamins-Minerals (MULTIVITAMIN MEN PO)   Yes No   allopurinol (ZYLOPRIM) 100 mg tablet   No No   Sig: Take 1 tablet (100 mg total) by mouth daily   cetirizine (ZyrTEC Allergy) 10 mg tablet   Yes No   fluticasone (Flonase) 50 mcg/act nasal spray   Yes No   pravastatin (PRAVACHOL) 10 mg tablet   No No   Sig: Take 1 tablet (10 mg total) by mouth daily at bedtime      Facility-Administered Medications: None       Past Medical History:   Diagnosis Date    Allergic     Gout     Hyperlipidemia     Kidney stone     Palpitations     Sleep apnea        Past Surgical History:   Procedure Laterality Date    URETERAL STENT PLACEMENT  2003    WISDOM TOOTH EXTRACTION  1980       Family History   Problem Relation Age of Onset    Hypertension Mother     Hyperlipidemia Mother     Hypertension Father     Hyperlipidemia Father     Depression Sister     Cancer Paternal Grandmother     Stroke Maternal Uncle      I have reviewed and agree with the history as documented  E-Cigarette/Vaping    E-Cigarette Use Never User      E-Cigarette/Vaping Substances     Social History     Tobacco Use    Smoking status: Never Smoker    Smokeless tobacco: Never Used   Vaping Use    Vaping Use: Never used   Substance Use Topics    Alcohol use: Never    Drug use: Never        Review of Systems   Reason unable to perform ROS: please see above for ROS  All other ROS negative  Gastrointestinal: Positive for abdominal pain  Physical Exam  ED Triage Vitals [04/23/22 0006]   Temperature Pulse Respirations Blood Pressure SpO2   98 2 °F (36 8 °C) 63 18 153/86 99 %      Temp Source Heart Rate Source Patient Position - Orthostatic VS BP Location FiO2 (%)   Oral Monitor Lying Right arm --      Pain Score       8             Orthostatic Vital Signs  Vitals:    04/23/22 0006 04/23/22 0015 04/23/22 0100 04/23/22 0215   BP: 153/86 138/84 147/82 130/63   Pulse: 63 72 74 80   Patient Position - Orthostatic VS: Lying Lying Lying Lying - Orthostatic VS       Physical Exam  Vitals and nursing note reviewed  Constitutional:       General: He is not in acute distress  Appearance: He is well-developed  He is not diaphoretic  HENT:      Head: Normocephalic and atraumatic  Eyes:      General: No scleral icterus  Conjunctiva/sclera: Conjunctivae normal    Neck:      Vascular: No JVD  Trachea: No tracheal deviation  Cardiovascular:      Rate and Rhythm: Normal rate  Pulmonary:      Effort: Pulmonary effort is normal    Abdominal:      General: There is no distension  Musculoskeletal:         General: No deformity  Cervical back: Neck supple  Skin:     General: Skin is warm and dry  Neurological:      Mental Status: He is alert     Psychiatric:         Behavior: Behavior normal          ED Medications  Medications   morphine (PF) 10 mg/mL injection 8 mg (8 mg Intravenous Given 4/23/22 0052)   iohexol (OMNIPAQUE) 350 MG/ML injection (SINGLE-DOSE) 100 mL (100 mL Intravenous Given 4/23/22 0146)   morphine (PF) 10 mg/mL injection 8 mg (8 mg Intravenous Given 4/23/22 0209)   ketorolac (TORADOL) injection 15 mg (15 mg Intravenous Given 4/23/22 0209)       Diagnostic Studies  Results Reviewed     Procedure Component Value Units Date/Time    Hepatic function panel [[id number]]  (Abnormal) Collected: 04/23/22 0054    Lab Status: Final result Specimen: Blood from Arm, Left Updated: 04/23/22 0124     Total Bilirubin 0 36 mg/dL      Bilirubin, Direct 0 06 mg/dL      Alkaline Phosphatase 52 U/L      AST 38 U/L      ALT 83 U/L      Total Protein 7 6 g/dL      Albumin 4 0 g/dL     Lipase [[id number]]  (Normal) Collected: 04/23/22 0054    Lab Status: Final result Specimen: Blood from Arm, Left Updated: 04/23/22 0124     Lipase 152 u/L     Basic metabolic panel [[id number]]  (Abnormal) Collected: 04/23/22 0054    Lab Status: Final result Specimen: Blood from Arm, Left Updated: 04/23/22 0124     Sodium 139 mmol/L      Potassium 4 0 mmol/L      Chloride 105 mmol/L      CO2 25 mmol/L      ANION GAP 9 mmol/L      BUN 16 mg/dL      Creatinine 1 31 mg/dL      Glucose 170 mg/dL      Calcium 9 0 mg/dL      eGFR 63 ml/min/1 73sq m     Narrative:      Breana guidelines for Chronic Kidney Disease (CKD):     Stage 1 with normal or high GFR (GFR > 90 mL/min/1 73 square meters)    Stage 2 Mild CKD (GFR = 60-89 mL/min/1 73 square meters)    Stage 3A Moderate CKD (GFR = 45-59 mL/min/1 73 square meters)    Stage 3B Moderate CKD (GFR = 30-44 mL/min/1 73 square meters)    Stage 4 Severe CKD (GFR = 15-29 mL/min/1 73 square meters)    Stage 5 End Stage CKD (GFR <15 mL/min/1 73 square meters)  Note: GFR calculation is accurate only with a steady state creatinine    CBC and differential [[id number]]  (Abnormal) Collected: 04/23/22 0054    Lab Status: Final result Specimen: Blood from Arm, Left Updated: 04/23/22 0115     WBC 8 81 Thousand/uL      RBC 5 27 Million/uL Hemoglobin 16 3 g/dL      Hematocrit 48 5 %      MCV 92 fL      MCH 30 9 pg      MCHC 33 6 g/dL      RDW 12 4 %      MPV 11 0 fL      Platelets 242 Thousands/uL      nRBC 0 /100 WBCs      Neutrophils Relative 47 %      Immat GRANS % 1 %      Lymphocytes Relative 34 %      Monocytes Relative 13 %      Eosinophils Relative 4 %      Basophils Relative 1 %      Neutrophils Absolute 4 15 Thousands/µL      Immature Grans Absolute 0 07 Thousand/uL      Lymphocytes Absolute 3 02 Thousands/µL      Monocytes Absolute 1 11 Thousand/µL      Eosinophils Absolute 0 38 Thousand/µL      Basophils Absolute 0 08 Thousands/µL     UA (URINE) with reflex to Scope [[id number]]     Lab Status: No result Specimen: Urine                  CT abdomen pelvis with contrast   Final Result by Mary Sheehan MD (04/23 5883)      Right hydroureter secondary to a 2 mm obstructing stone at the distal right ureter just proximal to the ureterovesical junction  1 9 cm right adrenal nodule for which non-contrast abdomen CT or MRI in 12 months recommended      Workstation performed: [id number]               Procedures  Procedures      ED Course  ED Course as of 04/23/22 0337   Sat Apr 23, 2022   0125 Baseline Cr 1 1-1 2                             SBIRT 20yo+      Most Recent Value   SBIRT (25 yo +)    In order to provide better care to our patients, we are screening all of our patients for alcohol and drug use  Would it be okay to ask you these screening questions? No Filed at: 04/23/2022 0008                MDM  Number of Diagnoses or Management Options  Diagnosis management comments: No pain out of proportion to exam or worsening of pain with or shortly after eating  No testicular pain  No peritoneal signs on exam   Patient has no pulsatile abdominal mass, known aneurysm, tearing or ripping pain  No tenderness at McBurney's point and no positive Billingsley sign, or Rovsing sign    No radiation of pain from flank to groin, CVA tenderness, urinary complains, or history of urolithiasis  No history of abdominal trauma or sickle cell disease  Patient does not have a hx of abdominal surgeries  Vital signs normal, no fever  Patient is able to pass flatus and stool, and can tolerate Po  Amount and/or Complexity of Data Reviewed  Clinical lab tests: ordered and reviewed  Tests in the radiology section of CPT®: ordered and reviewed  Review and summarize past medical records: yes    Patient Progress  Patient progress: stable      Disposition  Final diagnoses:   Nephrolithiasis   Adrenal nodule (HCC)   Nausea and vomiting, unspecified vomiting type   Abdominal pain     Time reflects when diagnosis was documented in both MDM as applicable and the Disposition within this note     Time User Action Codes Description Comment    4/23/2022  2:07 AM Promise Ready Add [N20 0] Nephrolithiasis     4/23/2022  2:07 AM Promise Ready Add [E27 8] Adrenal nodule ([address] )     4/23/2022  2:07 AM Promise Ready Add [R11 2] Nausea and vomiting, unspecified vomiting type     4/23/2022  2:07 AM Promise Ready Add [R10 9] Abdominal pain       ED Disposition     ED Disposition Condition Date/Time Comment    Discharge Stable Sat Apr 23, 2022  2:07 AM Esa Ladd discharge to home/self care  Results of completed tests discussed  Return to ER precautions given, verbal and written, and questions answered satisfactorily                  Follow-up Information     Follow up With Specialties Details Why Contact Info Additional Information    Yanira Calvillo MD Family Medicine Call in 1 day To recheck symptoms and follow up on your ER visit [address]  [address] [address]  18 [facility] Call in 1 day For specialty evaluation and/or treatment [address] [place] [phone]  5  [facility], [address] [place] [address], [phone]   [phone]          Discharge Medication List as of 4/23/2022  2:15 AM      START taking these medications    Details   oxyCODONE (Roxicodone) 5 immediate release tablet Take 1 tablet (5 mg total) by mouth every 4 (four) hours as needed for moderate pain for up to 15 doses Max Daily Amount: 30 mg, Starting Sat 4/23/2022, Normal         CONTINUE these medications which have NOT CHANGED    Details   allopurinol (ZYLOPRIM) 100 mg tablet Take 1 tablet (100 mg total) by mouth daily, Starting Wed 9/15/2021, Normal      ASPIRIN 81 PO Historical Med      cetirizine (ZyrTEC Allergy) 10 mg tablet Historical Med      fluticasone (Flonase) 50 mcg/act nasal spray Historical Med      Multiple Vitamins-Minerals (MULTIVITAMIN MEN PO) Historical Med      pravastatin (PRAVACHOL) 10 mg tablet Take 1 tablet (10 mg total) by mouth daily at bedtime, Starting Wed 4/6/2022, Normal               PDMP Review     None           ED Provider  Attending physically available and evaluated Amber Gar  LORY managed the patient along with the ED Attending      Electronically Signed by         Kelsey Galloway DO  04/23/22 0593

## 2022-04-25 ENCOUNTER — TELEPHONE (OUTPATIENT)
Dept: UROLOGY | Facility: MEDICAL CENTER | Age: 47
End: 2022-04-25

## 2022-04-25 DIAGNOSIS — E11.9 TYPE 2 DIABETES MELLITUS WITHOUT COMPLICATION, WITHOUT LONG-TERM CURRENT USE OF INSULIN (HCC): Primary | ICD-10-CM

## 2022-04-25 RX ORDER — METFORMIN HYDROCHLORIDE 500 MG/1
1000 TABLET, EXTENDED RELEASE ORAL
Qty: 180 TABLET | Refills: 0 | Status: SHIPPED | OUTPATIENT
Start: 2022-04-25 | End: 2022-07-12 | Stop reason: SDUPTHER

## 2022-04-25 NOTE — TELEPHONE ENCOUNTER
Please Triage -   New Patient- Anand    What is the reason for the patients appointment? patient called stating he went to ER 04/22/22  With flank pain   Patient has hx of kidney stones  Ct scan showed     KIDNEYS/URETERS:  Right hydroureter secondary to a 2 mm obstructing stone at the distal right ureter just proximal to the ureterovesical junction  Delayed right renal excretion  2 mm nonobstructing stone at the right superior pole  Imaging/Lab Results:      Do we accept the patient's insurance or is the patient Self-Pay? Provider & Plan: lara loyola   Member ID#: Has the patient had any previous urologist(s)? more than 10 years ago       Have patient records been requested?in [facility] ER        Has the patient had any outside testing done?in [facility]       Does the patient have a personal history of cancer?no       Patient can be reached at :

## 2022-04-25 NOTE — TELEPHONE ENCOUNTER
Called and spoke with patient  Advised that there is a new patient appointment tomorrow 4/26 at 2pm in [facility]  Patient took appointment  ER precautions reviewed

## 2022-04-26 ENCOUNTER — TELEMEDICINE (OUTPATIENT)
Dept: UROLOGY | Facility: AMBULATORY SURGERY CENTER | Age: 47
End: 2022-04-26
Payer: COMMERCIAL

## 2022-04-26 DIAGNOSIS — N20.0 NEPHROLITHIASIS: ICD-10-CM

## 2022-04-26 DIAGNOSIS — E27.8 ADRENAL NODULE (HCC): Primary | ICD-10-CM

## 2022-04-26 PROBLEM — E27.9 ADRENAL NODULE (HCC): Status: ACTIVE | Noted: 2022-04-26

## 2022-04-26 PROCEDURE — 99203 OFFICE O/P NEW LOW 30 MIN: CPT | Performed by: NURSE PRACTITIONER

## 2022-04-26 NOTE — ASSESSMENT & PLAN NOTE
1 9 cm right adrenal nodule found incidentally on recent CT scan  Radiology recommendation is to repeat non contrast imaging in 1 year with CT or MRI  Patient will request his PCP order follow up imaging

## 2022-04-26 NOTE — ASSESSMENT & PLAN NOTE
We reviewed the results of his recent CT scan performed in ED which identified a 2mm right distal ureteral calculus  Patient currently asymptomatic  He is unsure if he passed his calculus  Recommend obtaining a KUB and renal ultrasound to confirm passage  Will call patient with results  Additional 2mm right nonobstructing stone seen on imaging  Reviewed dietary recommendations  Routine prostate cancer screening can begin at age 54 years

## 2022-04-26 NOTE — PROGRESS NOTES
Virtual Brief Visit    Patient is located in the following state in which I hold an active license [place]      Assessment/Plan:    Problem List Items Addressed This Visit        Genitourinary    Nephrolithiasis     We reviewed the results of his recent CT scan performed in ED which identified a 2mm right distal ureteral calculus  Patient currently asymptomatic  He is unsure if he passed his calculus  Recommend obtaining a KUB and renal ultrasound to confirm passage  Will call patient with results  Additional 2mm right nonobstructing stone seen on imaging  Reviewed dietary recommendations  Routine prostate cancer screening can begin at age 52 years  Relevant Orders    XR abdomen 1 view kub    US kidney and bladder       Other    Adrenal nodule ([facility] [address] ) - Primary     1 9 cm right adrenal nodule found incidentally on recent CT scan  Radiology recommendation is to repeat non contrast imaging in 1 year with CT or MRI  Patient will request his PCP order follow up imaging  Call with results of KUB and renal ultrasound  If findings unremarkable, he will return in 1 year with KUB  All questions answered, patient verbalized understanding, and agrees with plan  History of Present Illness    49 y/o male who is being evaluated in consultation for nephrolithiasis  He recently experienced acute onset right lower abdominal pain and presented to the emergency department on 4/23/2022 for further evaluation  CT scan findings noted a 2mm right distal ureteral calculus  Additional findings include a 1 9 cm right adrenal nodule without any suspicious features  Recommendation is to repeat non contrast CT/MRI in 1 year  WBC 8 81, and Cr 1 31  He is currently asymptomatic  Patient denies any flank pain, lower urinary tract symptoms, gross hematuria, or dysuria  He reports a remote history of nephrolithiasis which required ureteroscopy and ureteral stent >15 years ago    Patient denies any recurrent episodes  He denies any additional urologic history, surgical intervention, or instrumentation  Patient denies any strong family history of kidney stones or prostate cancer  Recent Visits  No visits were found meeting these conditions  Showing recent visits within past 7 days and meeting all other requirements  Today's Visits  Date Type Provider Dept   04/26/22 [address] [facility]   Showing today's visits and meeting all other requirements  Future Appointments  No visits were found meeting these conditions    Showing future appointments within next 150 days and meeting all other requirements         I spent 15 minutes with patient today in which greater than 50% of the time was spent in counseling/coordination of care regarding plan of care

## 2022-04-29 ENCOUNTER — HOSPITAL ENCOUNTER (OUTPATIENT)
Dept: RADIOLOGY | Facility: HOSPITAL | Age: 47
Discharge: HOME | End: 2022-04-29
Payer: COMMERCIAL

## 2022-04-29 DIAGNOSIS — N20.0 NEPHROLITHIASIS: ICD-10-CM

## 2022-04-29 PROCEDURE — 74018 RADEX ABDOMEN 1 VIEW: CPT

## 2022-04-29 PROCEDURE — 76770 US EXAM ABDO BACK WALL COMP: CPT

## 2022-06-29 DIAGNOSIS — E78.00 HYPERCHOLESTEROLEMIA: ICD-10-CM

## 2022-06-29 RX ORDER — PRAVASTATIN SODIUM 10 MG
10 TABLET ORAL
Qty: 90 TABLET | Refills: 0 | Status: SHIPPED | OUTPATIENT
Start: 2022-06-29

## 2022-07-12 DIAGNOSIS — E11.9 TYPE 2 DIABETES MELLITUS WITHOUT COMPLICATION, WITHOUT LONG-TERM CURRENT USE OF INSULIN (HCC): ICD-10-CM

## 2022-07-12 DIAGNOSIS — M10.9 GOUT, UNSPECIFIED CAUSE, UNSPECIFIED CHRONICITY, UNSPECIFIED SITE: ICD-10-CM

## 2022-07-14 RX ORDER — ALLOPURINOL 100 MG/1
100 TABLET ORAL DAILY
Qty: 90 TABLET | Refills: 3 | Status: SHIPPED | OUTPATIENT
Start: 2022-07-14 | End: 2022-10-04 | Stop reason: SDUPTHER

## 2022-07-14 RX ORDER — METFORMIN HYDROCHLORIDE 500 MG/1
1000 TABLET, EXTENDED RELEASE ORAL
Qty: 180 TABLET | Refills: 0 | Status: SHIPPED | OUTPATIENT
Start: 2022-07-14 | End: 2022-10-04 | Stop reason: SDUPTHER

## 2022-07-20 ENCOUNTER — TELEPHONE (OUTPATIENT)
Dept: FAMILY MEDICINE CLINIC | Facility: HOSPITAL | Age: 47
End: 2022-07-20

## 2022-07-20 NOTE — TELEPHONE ENCOUNTER
Regarding: prednisone and metformin  ----- Message from Alphonse Suarez MD sent at 7/20/2022  8:40 AM EDT -----  Rx sent to patient  Communicated this to patient as well      ----- Message sent from Alphonse Suarez MD to Cynda Habermann at 7/20/2022  8:39 AM -----   Hi, I sent you the refills for prednisone for you to have on hand  I did place a few refills on there as well  Hopefully you do not have to use it that often  Take care        ----- Message -----       Jose Kan       Sent:7/19/2022  8:22 AM EDT         To:Kiel Lee and metformin    Good Morning Tawny Montague,  Dr Prem Rose is out of the office today and will not be back until tomorrow, he will review this than, any questions please let us know, thank you        ----- Message -----       From:Kiel Wayne  9:21 PM EDT         To:Casa Hogue MD    Subject:prednisone and metformin    Hi -- I received the refills for metformin and allopurinol (thank you)  However, I was hoping that a refill for prednisone can also be sent in, so I can have it available should I experience another flare up in the future  If possible, can enough tablets be phoned in for the following daily schedule:  60 mg x 3 days, 40 mg x 3 days, 20 mg x 3 days, then 10 mg x 3 days? Thank  you!      ----- Message -----       From:Kiel Wayne  4:41 PM EDT         To:Casa Hogue MD    Subject:prednisone and metformin    Sounds good  thank you!      ----- Message -----       Jose Kan       Sent:7/12/2022  3:53 PM EDT         To:Kiel Lee and metformin    HI Tawny Montague,  I did sent the refill back to our providers for approval, Dr Sandor Dietz is currently on vacation so another doctor will look into refilling   Any questions please let us know, thank you      ----- Message -----       From:Ramon Marja Boxer       Sent:7/12/2022  3:46 PM EDT         Ava Pineda MD Subject:prednisone and metformin    Hi -- unfortunately am having a mild flareup of my gout, for which I started steroids  Is helpful -- no side effects  Also wanted to let you know that I've been doing well since starting metformin -- have not experienced any side effects due to the medicine  Would you be able to provide refills for the metformin, as well as for the prednisone (to have available should it flare up again in the near future)? Any lab work for now, and should I wait till closer to my f/u appt in September?   Thanks -- Susu Galvan

## 2022-09-16 ENCOUNTER — OFFICE VISIT (OUTPATIENT)
Dept: FAMILY MEDICINE CLINIC | Facility: HOSPITAL | Age: 47
End: 2022-09-16
Payer: COMMERCIAL

## 2022-09-16 VITALS
SYSTOLIC BLOOD PRESSURE: 122 MMHG | TEMPERATURE: 96.7 F | HEART RATE: 66 BPM | HEIGHT: 71 IN | BODY MASS INDEX: 36.23 KG/M2 | WEIGHT: 258.8 LBS | DIASTOLIC BLOOD PRESSURE: 70 MMHG

## 2022-09-16 DIAGNOSIS — M10.9 GOUT, UNSPECIFIED CAUSE, UNSPECIFIED CHRONICITY, UNSPECIFIED SITE: ICD-10-CM

## 2022-09-16 DIAGNOSIS — E11.9 TYPE 2 DIABETES MELLITUS WITHOUT COMPLICATION, WITHOUT LONG-TERM CURRENT USE OF INSULIN (HCC): Primary | ICD-10-CM

## 2022-09-16 DIAGNOSIS — Z23 ENCOUNTER FOR IMMUNIZATION: ICD-10-CM

## 2022-09-16 DIAGNOSIS — N20.0 NEPHROLITHIASIS: ICD-10-CM

## 2022-09-16 DIAGNOSIS — E78.00 HYPERCHOLESTEROLEMIA: ICD-10-CM

## 2022-09-16 PROCEDURE — 99214 OFFICE O/P EST MOD 30 MIN: CPT | Performed by: FAMILY MEDICINE

## 2022-09-19 LAB
LEFT EYE DIABETIC RETINOPATHY: NORMAL
LEFT EYE IMAGE QUALITY: NORMAL
LEFT EYE MACULAR EDEMA: NORMAL
LEFT EYE OTHER RETINOPATHY: NORMAL
RIGHT EYE DIABETIC RETINOPATHY: NORMAL
RIGHT EYE MACULAR EDEMA: NORMAL
RIGHT EYE OTHER RETINOPATHY: NORMAL
SEVERITY (EYE EXAM): NORMAL

## 2022-09-19 NOTE — PROGRESS NOTES
Name: Brooklynn Wilkinson      : 1975      MRN: [id number]  Encounter Provider: Bia Cartagena MD  Encounter Date: 2022   Encounter department: [facility]  Type 2 diabetes mellitus without complication, without long-term current use of insulin (HCC)  -     CBC; Future  -     Comprehensive metabolic panel; Future  -     TSH, 3rd generation with Free T4 reflex; Future  -     Uric acid; Future  -     Microalbumin / creatinine urine ratio  -     IRIS Diabetic eye exam    2  Gout, unspecified cause, unspecified chronicity, unspecified site    3  Nephrolithiasis    4  Hypercholesterolemia    5  Encounter for immunization  chronic conditions stable  DM,  Uncontrolled  will recheck labs  Adjust meds if needed when labs are back  Continue with same regimen  cotninue with dietary control, work on increasing physical activity,   Iris examination, not able to complete  Advised Ophthalmology visit  Nephrolithiasis  Increase fluids  Will be following up with Urology  Gout  Had flare up  check uric acid  Work on [employer]  Keep uric acid < 6  Subjective      Here for fu of chronic conditions  Overall doing well  No new concerns  Did have gouty glare up and nephrolithiasis sinc ethe last visit  Nephrolithiasis in the past was not a uric acid stone  Tolerating medications well  Review of Systems   Constitutional: Negative  Negative for activity change, appetite change, chills and diaphoresis  HENT: Negative for congestion and dental problem  Respiratory: Negative  Negative for apnea, chest tightness, shortness of breath and wheezing  Cardiovascular: Negative  Negative for chest pain, palpitations and leg swelling  Gastrointestinal: Negative  Negative for abdominal distention, abdominal pain, constipation, diarrhea and nausea  Genitourinary: Negative    Negative for difficulty urinating, dysuria and frequency  Current Outpatient Medications on File Prior to Visit   Medication Sig    allopurinol (ZYLOPRIM) 100 mg tablet Take 1 tablet (100 mg total) by mouth daily    ASPIRIN 81 PO     cetirizine (ZyrTEC) 10 mg tablet     fluticasone (FLONASE) 50 mcg/act nasal spray     metFORMIN (GLUCOPHAGE-XR) 500 mg 24 hr tablet Take 2 tablets (1,000 mg total) by mouth daily with dinner    Multiple Vitamins-Minerals (MULTIVITAMIN MEN PO)     pravastatin (PRAVACHOL) 10 mg tablet Take 1 tablet (10 mg total) by mouth daily at bedtime       Objective     /70   Pulse 66   Temp (!) 96 7 °F (35 9 °C)   Ht 5' 11" (1 803 m)   Wt 117 kg (258 lb 12 8 oz)   BMI 36 10 kg/m²     Physical Exam  Vitals and nursing note reviewed  Constitutional:       General: He is not in acute distress  Appearance: Normal appearance  He is well-developed  He is not ill-appearing  HENT:      Head: Normocephalic and atraumatic  Right Ear: External ear normal       Left Ear: External ear normal       Nose: Nose normal  No congestion or rhinorrhea  Mouth/Throat:      Mouth: Mucous membranes are moist       Pharynx: No oropharyngeal exudate or posterior oropharyngeal erythema  Eyes:      Extraocular Movements: Extraocular movements intact  Conjunctiva/sclera: Conjunctivae normal       Pupils: Pupils are equal, round, and reactive to light  Cardiovascular:      Rate and Rhythm: Normal rate and regular rhythm  Pulses: no weak pulses          Dorsalis pedis pulses are 2+ on the right side and 2+ on the left side  Posterior tibial pulses are 2+ on the right side and 2+ on the left side  Heart sounds: Normal heart sounds  No murmur heard  No friction rub  No gallop  Pulmonary:      Effort: Pulmonary effort is normal  No respiratory distress  Breath sounds: Normal breath sounds  No wheezing or rales  Chest:      Chest wall: No tenderness     Abdominal:      General: Bowel sounds are normal  There is no distension  Palpations: Abdomen is soft  There is no mass  Tenderness: There is no abdominal tenderness  There is no guarding or rebound  Musculoskeletal:         General: Normal range of motion  Cervical back: Normal range of motion and neck supple  Feet:      Right foot:      Skin integrity: No ulcer, skin breakdown, erythema, warmth, callus or dry skin  Left foot:      Skin integrity: No ulcer, skin breakdown, erythema, warmth, callus or dry skin  Skin:     General: Skin is warm  Capillary Refill: Capillary refill takes less than 2 seconds  Neurological:      Mental Status: He is alert and oriented to person, place, and time  Psychiatric:         Mood and Affect: Mood normal          Behavior: Behavior normal      Diabetic Foot Exam    Patient's shoes and socks removed  Right Foot/Ankle   Right Foot Inspection  Skin Exam: skin normal and skin intact  No dry skin, no warmth, no callus, no erythema, no maceration, no abnormal color, no pre-ulcer, no ulcer and no callus  Toe Exam: ROM and strength within normal limits  Sensory   Vibration: intact  Proprioception: intact  Monofilament testing: intact    Vascular  Capillary refills: < 3 seconds  The right DP pulse is 2+  The right PT pulse is 2+  Left Foot/Ankle  Left Foot Inspection  Skin Exam: skin normal and skin intact  No dry skin, no warmth, no erythema, no maceration, normal color, no pre-ulcer, no ulcer and no callus  Toe Exam: ROM and strength within normal limits  Sensory   Vibration: intact  Proprioception: intact  Monofilament testing: intact    Vascular  Capillary refills: < 3 seconds  The left DP pulse is 2+  The left PT pulse is 2+       Assign Risk Category  No deformity present  No loss of protective sensation  No weak pulses  Risk: 0    Val Mckenna MD

## 2022-09-28 ENCOUNTER — APPOINTMENT (OUTPATIENT)
Dept: LAB | Facility: CLINIC | Age: 47
End: 2022-09-28
Payer: COMMERCIAL

## 2022-09-28 DIAGNOSIS — E11.9 TYPE 2 DIABETES MELLITUS WITHOUT COMPLICATION, WITHOUT LONG-TERM CURRENT USE OF INSULIN (HCC): Primary | ICD-10-CM

## 2022-09-28 DIAGNOSIS — E11.9 TYPE 2 DIABETES MELLITUS WITHOUT COMPLICATION, WITHOUT LONG-TERM CURRENT USE OF INSULIN (HCC): ICD-10-CM

## 2022-09-28 LAB
ALBUMIN SERPL BCP-MCNC: 4 G/DL (ref 3.5–5)
ALP SERPL-CCNC: 42 U/L (ref 46–116)
ALT SERPL W P-5'-P-CCNC: 81 U/L (ref 12–78)
ANION GAP SERPL CALCULATED.3IONS-SCNC: 7 MMOL/L (ref 4–13)
AST SERPL W P-5'-P-CCNC: 30 U/L (ref 5–45)
BILIRUB SERPL-MCNC: 0.61 MG/DL (ref 0.2–1)
BUN SERPL-MCNC: 11 MG/DL (ref 5–25)
CALCIUM SERPL-MCNC: 9.1 MG/DL (ref 8.3–10.1)
CHLORIDE SERPL-SCNC: 107 MMOL/L (ref 96–108)
CO2 SERPL-SCNC: 26 MMOL/L (ref 21–32)
CREAT SERPL-MCNC: 1.28 MG/DL (ref 0.6–1.3)
CREAT UR-MCNC: 304 MG/DL
ERYTHROCYTE [DISTWIDTH] IN BLOOD BY AUTOMATED COUNT: 12.6 % (ref 11.6–15.1)
GFR SERPL CREATININE-BSD FRML MDRD: 65 ML/MIN/1.73SQ M
GLUCOSE P FAST SERPL-MCNC: 132 MG/DL (ref 65–99)
HCT VFR BLD AUTO: 50.8 % (ref 36.5–49.3)
HGB BLD-MCNC: 16.8 G/DL (ref 12–17)
MCH RBC QN AUTO: 31.2 PG (ref 26.8–34.3)
MCHC RBC AUTO-ENTMCNC: 33.1 G/DL (ref 31.4–37.4)
MCV RBC AUTO: 94 FL (ref 82–98)
MICROALBUMIN UR-MCNC: 15.6 MG/L (ref 0–20)
MICROALBUMIN/CREAT 24H UR: 5 MG/G CREATININE (ref 0–30)
PLATELET # BLD AUTO: 280 THOUSANDS/UL (ref 149–390)
PMV BLD AUTO: 10.3 FL (ref 8.9–12.7)
POTASSIUM SERPL-SCNC: 4.3 MMOL/L (ref 3.5–5.3)
PROT SERPL-MCNC: 7.5 G/DL (ref 6.4–8.4)
RBC # BLD AUTO: 5.38 MILLION/UL (ref 3.88–5.62)
SODIUM SERPL-SCNC: 140 MMOL/L (ref 135–147)
TSH SERPL DL<=0.05 MIU/L-ACNC: 1.34 UIU/ML (ref 0.45–4.5)
URATE SERPL-MCNC: 7.4 MG/DL (ref 3.5–8.5)
WBC # BLD AUTO: 5.74 THOUSAND/UL (ref 4.31–10.16)

## 2022-09-28 PROCEDURE — 36415 COLL VENOUS BLD VENIPUNCTURE: CPT

## 2022-09-28 PROCEDURE — 85027 COMPLETE CBC AUTOMATED: CPT

## 2022-09-28 PROCEDURE — 82043 UR ALBUMIN QUANTITATIVE: CPT | Performed by: FAMILY MEDICINE

## 2022-09-28 PROCEDURE — 83036 HEMOGLOBIN GLYCOSYLATED A1C: CPT

## 2022-09-28 PROCEDURE — 84443 ASSAY THYROID STIM HORMONE: CPT

## 2022-09-28 PROCEDURE — 82570 ASSAY OF URINE CREATININE: CPT | Performed by: FAMILY MEDICINE

## 2022-09-28 PROCEDURE — 80053 COMPREHEN METABOLIC PANEL: CPT

## 2022-09-28 PROCEDURE — 84550 ASSAY OF BLOOD/URIC ACID: CPT

## 2022-09-30 LAB
EST. AVERAGE GLUCOSE BLD GHB EST-MCNC: 146 MG/DL
HBA1C MFR BLD: 6.7 %

## 2022-10-04 DIAGNOSIS — E11.9 TYPE 2 DIABETES MELLITUS WITHOUT COMPLICATION, WITHOUT LONG-TERM CURRENT USE OF INSULIN (HCC): ICD-10-CM

## 2022-10-04 DIAGNOSIS — M10.9 GOUT, UNSPECIFIED CAUSE, UNSPECIFIED CHRONICITY, UNSPECIFIED SITE: ICD-10-CM

## 2022-10-04 DIAGNOSIS — E78.00 HYPERCHOLESTEROLEMIA: ICD-10-CM

## 2022-10-04 RX ORDER — PRAVASTATIN SODIUM 10 MG
10 TABLET ORAL
Qty: 90 TABLET | Refills: 0 | Status: SHIPPED | OUTPATIENT
Start: 2022-10-04 | End: 2022-10-05 | Stop reason: SDUPTHER

## 2022-10-04 RX ORDER — METFORMIN HYDROCHLORIDE 500 MG/1
1000 TABLET, EXTENDED RELEASE ORAL
Qty: 180 TABLET | Refills: 0 | Status: SHIPPED | OUTPATIENT
Start: 2022-10-04 | End: 2022-10-05 | Stop reason: SDUPTHER

## 2022-10-04 RX ORDER — ALLOPURINOL 100 MG/1
100 TABLET ORAL DAILY
Qty: 90 TABLET | Refills: 3 | Status: SHIPPED | OUTPATIENT
Start: 2022-10-04 | End: 2022-10-05 | Stop reason: SDUPTHER

## 2022-10-05 DIAGNOSIS — M10.9 GOUT, UNSPECIFIED CAUSE, UNSPECIFIED CHRONICITY, UNSPECIFIED SITE: ICD-10-CM

## 2022-10-05 DIAGNOSIS — E11.9 TYPE 2 DIABETES MELLITUS WITHOUT COMPLICATION, WITHOUT LONG-TERM CURRENT USE OF INSULIN (HCC): ICD-10-CM

## 2022-10-05 DIAGNOSIS — E78.00 HYPERCHOLESTEROLEMIA: ICD-10-CM

## 2022-10-05 RX ORDER — PRAVASTATIN SODIUM 10 MG
10 TABLET ORAL
Qty: 90 TABLET | Refills: 3 | Status: SHIPPED | OUTPATIENT
Start: 2022-10-05

## 2022-10-05 RX ORDER — ALLOPURINOL 100 MG/1
200 TABLET ORAL DAILY
Qty: 180 TABLET | Refills: 1 | Status: SHIPPED | OUTPATIENT
Start: 2022-10-05 | End: 2023-04-03

## 2022-10-05 RX ORDER — METFORMIN HYDROCHLORIDE 500 MG/1
1000 TABLET, EXTENDED RELEASE ORAL
Qty: 180 TABLET | Refills: 3 | Status: SHIPPED | OUTPATIENT
Start: 2022-10-05

## 2022-12-30 DIAGNOSIS — E78.00 HYPERCHOLESTEROLEMIA: ICD-10-CM

## 2022-12-30 DIAGNOSIS — M10.9 GOUT, UNSPECIFIED CAUSE, UNSPECIFIED CHRONICITY, UNSPECIFIED SITE: ICD-10-CM

## 2022-12-30 DIAGNOSIS — E11.9 TYPE 2 DIABETES MELLITUS WITHOUT COMPLICATION, WITHOUT LONG-TERM CURRENT USE OF INSULIN (HCC): ICD-10-CM

## 2022-12-30 RX ORDER — METFORMIN HYDROCHLORIDE 500 MG/1
1000 TABLET, EXTENDED RELEASE ORAL
Qty: 180 TABLET | Refills: 0 | Status: SHIPPED | OUTPATIENT
Start: 2022-12-30

## 2022-12-30 RX ORDER — ALLOPURINOL 100 MG/1
200 TABLET ORAL DAILY
Qty: 180 TABLET | Refills: 0 | Status: SHIPPED | OUTPATIENT
Start: 2022-12-30 | End: 2023-06-28

## 2022-12-30 RX ORDER — PRAVASTATIN SODIUM 10 MG
10 TABLET ORAL
Qty: 90 TABLET | Refills: 0 | Status: SHIPPED | OUTPATIENT
Start: 2022-12-30

## 2023-03-22 DIAGNOSIS — M10.9 GOUT, UNSPECIFIED CAUSE, UNSPECIFIED CHRONICITY, UNSPECIFIED SITE: ICD-10-CM

## 2023-03-22 DIAGNOSIS — E11.9 TYPE 2 DIABETES MELLITUS WITHOUT COMPLICATION, WITHOUT LONG-TERM CURRENT USE OF INSULIN (HCC): ICD-10-CM

## 2023-03-22 DIAGNOSIS — E78.00 HYPERCHOLESTEROLEMIA: ICD-10-CM

## 2023-03-22 RX ORDER — PRAVASTATIN SODIUM 10 MG
TABLET ORAL
Qty: 90 TABLET | Refills: 0 | Status: SHIPPED | OUTPATIENT
Start: 2023-03-22

## 2023-03-22 RX ORDER — ALLOPURINOL 100 MG/1
TABLET ORAL
Qty: 180 TABLET | Refills: 0 | Status: SHIPPED | OUTPATIENT
Start: 2023-03-22

## 2023-03-22 RX ORDER — METFORMIN HYDROCHLORIDE 500 MG/1
TABLET, EXTENDED RELEASE ORAL
Qty: 180 TABLET | Refills: 0 | Status: SHIPPED | OUTPATIENT
Start: 2023-03-22

## 2023-03-24 ENCOUNTER — OFFICE VISIT (OUTPATIENT)
Dept: FAMILY MEDICINE CLINIC | Facility: HOSPITAL | Age: 48
End: 2023-03-24

## 2023-03-24 VITALS
HEART RATE: 64 BPM | HEIGHT: 71 IN | TEMPERATURE: 97.7 F | WEIGHT: 259 LBS | DIASTOLIC BLOOD PRESSURE: 80 MMHG | BODY MASS INDEX: 36.26 KG/M2 | SYSTOLIC BLOOD PRESSURE: 118 MMHG

## 2023-03-24 DIAGNOSIS — E66.01 CLASS 2 SEVERE OBESITY DUE TO EXCESS CALORIES WITH SERIOUS COMORBIDITY AND BODY MASS INDEX (BMI) OF 36.0 TO 36.9 IN ADULT (HCC): ICD-10-CM

## 2023-03-24 DIAGNOSIS — G47.33 OSA (OBSTRUCTIVE SLEEP APNEA): ICD-10-CM

## 2023-03-24 DIAGNOSIS — E11.9 TYPE 2 DIABETES MELLITUS WITHOUT COMPLICATION, WITHOUT LONG-TERM CURRENT USE OF INSULIN (HCC): Primary | ICD-10-CM

## 2023-03-24 DIAGNOSIS — Z12.5 SCREENING PSA (PROSTATE SPECIFIC ANTIGEN): ICD-10-CM

## 2023-03-24 DIAGNOSIS — M10.9 GOUT, UNSPECIFIED CAUSE, UNSPECIFIED CHRONICITY, UNSPECIFIED SITE: ICD-10-CM

## 2023-03-24 LAB

## 2023-03-24 NOTE — PROGRESS NOTES
Name: Jannet Davey      : 1975      MRN: [id number]  Encounter Provider: Lorie Valente MD  Encounter Date: 3/24/2023   Encounter department: [facility]  Type 2 diabetes mellitus without complication, without long-term current use of insulin (HCC)  -     CBC; Future  -     Comprehensive metabolic panel; Future  -     Lipid Panel with Direct LDL reflex; Future  -     Hemoglobin A1C; Future  -     Microalbumin / creatinine urine ratio  -     TSH, 3rd generation with Free T4 reflex; Future    2  Class 2 severe obesity due to excess calories with serious comorbidity and body mass index (BMI) of 36 0 to 36 9 in adult [facility])  -     Ambulatory Referral to Weight Management; Future    3  Screening PSA (prostate specific antigen)  -     PSA, Total Screen; Future    4  RADHA (obstructive sleep apnea)    5  Gout, unspecified cause, unspecified chronicity, unspecified site  -     Uric acid; Future     Type 2 diabetes  Has been improved and better controlled  We will update blood work with next visit  Obstructive sleep apnea  He has been doing well with this  Supplies are sent  Patient would like to go to the weight loss center along with his wife  Referrals placed  Luzma Leonard is here for follow-up of chronic conditions  He is doing well  Last A1c was well under control  Doing well with medications  Does have known obstructive sleep apnea  Needing replenishment of supplies  Asking about the weight loss center  Would like to do it with his wife  Review of Systems   Constitutional: Negative  Negative for activity change, appetite change, chills and diaphoresis  HENT: Negative for congestion and dental problem  Respiratory: Negative  Negative for apnea, chest tightness, shortness of breath and wheezing  Cardiovascular: Negative  Negative for chest pain, palpitations and leg swelling     Gastrointestinal: "Negative  Negative for abdominal distention, abdominal pain, constipation, diarrhea and nausea  Genitourinary: Negative  Negative for difficulty urinating, dysuria and frequency  Current Outpatient Medications on File Prior to Visit   Medication Sig   • allopurinol (ZYLOPRIM) 100 mg tablet TAKE TWO TABLETS BY MOUTH DAILY   • ASPIRIN 81 PO    • metFORMIN (GLUCOPHAGE-XR) 500 mg 24 hr tablet TAKE TWO TABLETS BY MOUTH DAILY WITH DINNER   • Multiple Vitamins-Minerals (MULTIVITAMIN MEN PO)    • pravastatin (PRAVACHOL) 10 mg tablet TAKE ONE TABLET BY MOUTH DAILY AT BEDTIME       Objective     /80   Pulse 64   Temp 97 7 °F (36 5 °C)   Ht 5' 11\" (1 803 m)   Wt 117 kg (259 lb)   BMI 36 12 kg/m²     Physical Exam  Vitals and nursing note reviewed  Constitutional:       General: He is not in acute distress  Appearance: Normal appearance  He is well-developed  He is obese  He is not ill-appearing  HENT:      Head: Normocephalic and atraumatic  Right Ear: Tympanic membrane, ear canal and external ear normal       Left Ear: Tympanic membrane, ear canal and external ear normal       Nose: Nose normal  No congestion or rhinorrhea  Mouth/Throat:      Mouth: Mucous membranes are moist       Pharynx: No oropharyngeal exudate or posterior oropharyngeal erythema  Eyes:      Extraocular Movements: Extraocular movements intact  Conjunctiva/sclera: Conjunctivae normal       Pupils: Pupils are equal, round, and reactive to light  Cardiovascular:      Rate and Rhythm: Normal rate and regular rhythm  Heart sounds: Normal heart sounds  No murmur heard  No friction rub  No gallop  Pulmonary:      Effort: Pulmonary effort is normal  No respiratory distress  Breath sounds: Normal breath sounds  No wheezing or rales  Chest:      Chest wall: No tenderness  Abdominal:      General: Bowel sounds are normal  There is no distension  Palpations: Abdomen is soft  There is no mass       " Tenderness: There is no abdominal tenderness  There is no guarding or rebound  Musculoskeletal:         General: Normal range of motion  Cervical back: Normal range of motion and neck supple  Skin:     General: Skin is warm  Capillary Refill: Capillary refill takes less than 2 seconds  Neurological:      Mental Status: He is alert and oriented to person, place, and time     Psychiatric:         Mood and Affect: Mood normal          Behavior: Behavior normal        Jillian Cordova MD

## 2023-03-27 LAB

## 2023-05-02 ENCOUNTER — PATIENT MESSAGE (OUTPATIENT)
Dept: UROLOGY | Facility: AMBULATORY SURGERY CENTER | Age: 48
End: 2023-05-02

## 2023-05-02 DIAGNOSIS — N20.0 NEPHROLITHIASIS: Primary | ICD-10-CM

## 2023-05-05 ENCOUNTER — HOSPITAL ENCOUNTER (OUTPATIENT)
Dept: RADIOLOGY | Facility: HOSPITAL | Age: 48
Discharge: HOME | End: 2023-05-05

## 2023-05-05 DIAGNOSIS — N20.0 NEPHROLITHIASIS: ICD-10-CM

## 2023-05-31 ENCOUNTER — OFFICE VISIT (OUTPATIENT)
Dept: UROLOGY | Facility: CLINIC | Age: 48
End: 2023-05-31

## 2023-05-31 VITALS
WEIGHT: 259 LBS | HEIGHT: 71 IN | BODY MASS INDEX: 36.26 KG/M2 | SYSTOLIC BLOOD PRESSURE: 130 MMHG | DIASTOLIC BLOOD PRESSURE: 72 MMHG | HEART RATE: 70 BPM

## 2023-05-31 DIAGNOSIS — Z12.5 PROSTATE CANCER SCREENING: ICD-10-CM

## 2023-05-31 DIAGNOSIS — R36.1 HEMATOSPERMIA: ICD-10-CM

## 2023-05-31 DIAGNOSIS — E27.8 ADRENAL NODULE (HCC): Primary | ICD-10-CM

## 2023-05-31 DIAGNOSIS — R10.9 FLANK PAIN: ICD-10-CM

## 2023-05-31 DIAGNOSIS — N20.0 NEPHROLITHIASIS: ICD-10-CM

## 2023-05-31 RX ORDER — DEXAMETHASONE 1 MG
1 TABLET ORAL ONCE
Qty: 1 TABLET | Refills: 0 | Status: SHIPPED | OUTPATIENT
Start: 2023-05-31 | End: 2023-05-31

## 2023-05-31 NOTE — ASSESSMENT & PLAN NOTE
· Encouraged increased hydration  · CT stone study  · Follow-up 1 year, sooner pending results of CT scan

## 2023-05-31 NOTE — PROGRESS NOTES
"  Assessment and plan:     Adrenal nodule (HCC)  · Aldosterone level  · Renin activity plasma (or direct assay)  · Metanephrines fractionated plasma free  · Cortisol AM ( by 8:00 a m )  · dexamethasone 1mg at 11pm night prior to AM labs        Hematospermia  · Intermittent  · Check psa  · Send urine for micro and culture  · Follow up 1 year    Nephrolithiasis  · Encouraged increased hydration  · CT stone study  · Follow-up 1 year, sooner pending results of CT scan    Prostate cancer screening  · PSA/SEBAS per primary provider per patient          MACRINA Wise    History of Present Illness     Valentina Garza is a 46 y o  male patient who follows with Anthony Martell for adrenal nodule and kidney stones  He was last seen in telemedicine visit 1 year ago  At that time he had a CT scan in the emergency department that revealed a 2 mm right distal ureteral calculus  He was asymptomatic at the time  His follow-up imaging did not reveal any ureteral stone  He reports a remote history of nephrolithiasis which required ureteroscopy and ureteral stent >15 years ago  He had recent ultrasound and KUB however reports ongoing intermittent flank pain  He also had a 1 9 cm right adrenal nodule without suspicious features found incidentally on a CT scan  He was to have follow-up imaging in 1 year  He has not had prior metabolic work-up  He also notes intermittent hematospermia  Denies any urinary symptoms with this  Denies any perineal discomfort      Laboratory     Lab Results   Component Value Date    BUN 11 09/28/2022    CREATININE 1 28 09/28/2022       No components found for: \"GFR\"    Lab Results   Component Value Date    CALCIUM 9 1 09/28/2022     09/28/2022    CO2 26 09/28/2022    K 4 3 09/28/2022       Lab Results   Component Value Date    HCT 50 8 (H) 09/28/2022    HGB 16 8 09/28/2022    MCV 94 09/28/2022     09/28/2022    WBC 5 74 09/28/2022       No results found for: \"PSA\"    No results found for this " or any previous visit (from the past 1 hour(s))  @RESULT(URINEMICROSCOPIC)@    @RESULT(URINECULTURE)@    Radiology   ABDOMEN 5/5/2023     INDICATION:   N20 0: Calculus of kidney      COMPARISON: Abdominal x-ray 4/29/2022     VIEWS:  AP supine  Images: 3     FINDINGS:     No radiopaque renal calculi seen      No radiopaque ureteral calculi identified      Nonobstructive bowel gas pattern      No acute osseous abnormality is seen      IMPRESSION:     No radiopaque urinary tract calculi  RENAL ULTRASOUND 5/5/2023     INDICATION:   N20 0: Calculus of kidney      COMPARISON: Renal ultrasound April 29, 2022, CT abdomen and pelvis April 2022     TECHNIQUE:   Ultrasound of the retroperitoneum was performed with a curvilinear transducer utilizing volumetric sweeps and still imaging techniques      FINDINGS:     KIDNEYS:  Symmetric and normal size  Right kidney:  11 2 x 6 1 x 5 0 cm  Volume 178 5 mL  Left kidney:  10 9 x 6 9 x 4 6 cm  Volume 182 1 mL     Right kidney  Normal echogenicity and contour  No mass is identified  No hydronephrosis  No shadowing calculi  No perinephric fluid collections      Left kidney  Normal echogenicity and contour  No mass is identified  No hydronephrosis  No shadowing calculi  No perinephric fluid collections      URETERS:  Nonvisualized      BLADDER:  Normally distended  No focal thickening or mass lesions  Bilateral ureteral jets detected  Hepatic steatosis is incidentally noted         IMPRESSION:     Normal kidneys  Hepatic steatosis      Review of Systems     Review of Systems   Constitutional: Negative for activity change, appetite change, chills, fatigue, fever and unexpected weight change  HENT: Negative for facial swelling  Eyes: Negative for discharge  Respiratory: Negative  Negative for cough and shortness of breath  Cardiovascular: Negative for chest pain and leg swelling  Gastrointestinal: Negative    Negative for abdominal distention, abdominal pain, constipation, diarrhea, nausea and vomiting  Endocrine: Negative  Genitourinary: Positive for flank pain  Negative for decreased urine volume, difficulty urinating, dysuria, enuresis, frequency, genital sores, hematuria and urgency  Intermittent hematospermia   Musculoskeletal: Negative for back pain and myalgias  Skin: Negative for pallor and rash  Allergic/Immunologic: Negative  Negative for immunocompromised state  Neurological: Negative for facial asymmetry and speech difficulty  Psychiatric/Behavioral: Negative for agitation and confusion  Allergies     No Known Allergies    Physical Exam     Physical Exam  Vitals reviewed  Constitutional:       General: He is not in acute distress  Appearance: Normal appearance  He is obese  He is not ill-appearing, toxic-appearing or diaphoretic  HENT:      Head: Normocephalic and atraumatic  Eyes:      General: No scleral icterus  Cardiovascular:      Rate and Rhythm: Normal rate  Pulmonary:      Effort: Pulmonary effort is normal  No respiratory distress  Abdominal:      General: Abdomen is flat  There is no distension  Palpations: Abdomen is soft  Tenderness: There is no abdominal tenderness  There is no right CVA tenderness, left CVA tenderness, guarding or rebound  Genitourinary:     Comments: Declined exam  Musculoskeletal:         General: No swelling  Cervical back: Normal range of motion  Skin:     General: Skin is warm and dry  Coloration: Skin is not jaundiced or pale  Findings: No rash  Neurological:      General: No focal deficit present  Mental Status: He is alert and oriented to person, place, and time  Gait: Gait normal    Psychiatric:         Mood and Affect: Mood normal          Behavior: Behavior normal          Thought Content:  Thought content normal          Judgment: Judgment normal          Vital Signs     Vitals:    05/31/23 0805   BP: 130/72   BP Location: Left "arm   Patient Position: Sitting   Cuff Size: Adult   Pulse: 70   Weight: 117 kg (259 lb)   Height: 5' 11\" (1 803 m)       Current Medications       Current Outpatient Medications:   •  allopurinol (ZYLOPRIM) 100 mg tablet, TAKE TWO TABLETS BY MOUTH DAILY, Disp: 180 tablet, Rfl: 0  •  ASPIRIN 81 PO, , Disp: , Rfl:   •  dexamethasone (DECADRON) 1 mg tablet, Take 1 tablet (1 mg total) by mouth 1 (one) time for 1 dose Take 1 tablet at 11 PM the night before your blood work    Have your blood work completed by 8 AM the next morning, Disp: 1 tablet, Rfl: 0  •  metFORMIN (GLUCOPHAGE-XR) 500 mg 24 hr tablet, TAKE TWO TABLETS BY MOUTH DAILY WITH DINNER, Disp: 180 tablet, Rfl: 0  •  Multiple Vitamins-Minerals (MULTIVITAMIN MEN PO), , Disp: , Rfl:   •  pravastatin (PRAVACHOL) 10 mg tablet, TAKE ONE TABLET BY MOUTH DAILY AT BEDTIME, Disp: 90 tablet, Rfl: 0    Active Problems     Patient Active Problem List   Diagnosis   • Gout   • Hypercholesterolemia   • Premature atrial contractions   • Seasonal allergies   • Nephrolithiasis   • Adrenal nodule (HCC)   • Type 2 diabetes mellitus without complication, without long-term current use of insulin (HCC)   • RADHA (obstructive sleep apnea)   • Hematospermia   • Prostate cancer screening   • Flank pain       Past Medical History     Past Medical History:   Diagnosis Date   • Allergic    • Diabetes mellitus ([facility] [address] ) March 2022   • Gout    • Hyperlipidemia    • Kidney stone    • Palpitations    • Sleep apnea        Surgical History     Past Surgical History:   Procedure Laterality Date   • URETERAL STENT PLACEMENT  2003   • WISDOM TOOTH EXTRACTION  1980       Family History     Family History   Problem Relation Age of Onset   • Hypertension Mother    • Hyperlipidemia Mother    • Hypertension Father    • Hyperlipidemia Father    • Depression Sister    • Cancer Paternal Grandmother         pancreatic cancer   • Stroke Maternal Uncle        Social History     Social History     Social History " Tobacco Use   Smoking Status Never   Smokeless Tobacco Never       Past Surgical History:   Procedure Laterality Date   • URETERAL STENT PLACEMENT  2003   • [facility] [place]         The following portions of the patient's history were reviewed and updated as appropriate: allergies, current medications, past family history, past medical history, past social history, past surgical history and problem list    Please note :  Voice dictation software has been used to create this document  There may be inadvertent transcription errors      [id number] [id number] Roderick Velazquez

## 2023-05-31 NOTE — ASSESSMENT & PLAN NOTE
· Aldosterone level  · Renin activity plasma (or direct assay)  · Metanephrines fractionated plasma free  · Cortisol AM ( by 8:00 a m )  · dexamethasone 1mg at 11pm night prior to AM labs

## 2023-05-31 NOTE — PATIENT INSTRUCTIONS
Dietary Management of Kidney Stone Disease    The dietary recommendations for most people who make kidney stones (especially the most common calcium oxalate stones) are uncomplicated and are not too tedious or bland  Most importantly, the following recommendations also promote better health for a variety of reasons  FLUIDS:  The single most important change for the majority patients is the need to greatly increase fluid intake  You should at least produce two liters (about two quarts) of urine each day  Depending on the heat outdoors and your level of physical activity, this usually means consuming ten, 10 ounce glasses (100 ounces) of fluid per day  Water is always a good choice, but other drinks including tea, coffee, soda, and juice are also allowed as long as no one beverage becomes the sole source of fluid  CALCIUM:  There is excellent evidence that calcium should not be avoided, but instead moderated  A range of 600 to 1,100 mg of calcium per day, especially consumed at meals is probably a reasonable target  (i e  2-3 dairy servings per day) This might include small servings of yogurt, milk or ice cream   This amount helps avoid over-absorption of oxalate from the digestive tract and also allows for healthy bone maintenance  SODIUM (SALT): Too much salt in your diet (both from the shaker and in the prepared foods that we buy) is bad for your blood pressure, bad for your heart, and also increases the amount of calcium in your urine  A reasonable sodium restriction to 2,000-2,500mg/day (about the amount in one teaspoon) is an excellent target  You should get into the habit of reading the “Nutrients” labels on all the foods that you eat and watch out for the foods that have a high sodium content (snack foods, smoked or processed foods, caned foods)  Fresh and frozen foods usually have the least amount of sodium      PROTEIN:  High protein diets from animal meat (beef, chicken, pork, fish) also increases the rate of kidney stone formation and is equally unhealthy for your heart  All patients should moderate their meat intake to 3-7 ounces per day, and particularly stay away from red meat protein  OXALATE:  Most stone-formers should avoid heavy intake of oxalate-rich foods  These include green roughage (spinach, mustard, kale), strawberries, chocolate, tea, iced tea, and nuts  In addition, heavy, excess doses of Vitamin C can also produce surges in urinary oxalate levels and should be avoided  ** THESE FOODS ARE HIGH IN OXALATE - TRY TO LIMIT HOW MUCH OF THESE YOU EAT:  Coke and Pepsi  Nuts  Dark Leafy Greens:     Spinach and Kale, Rhubarb, Chard  Asparagus  Beets  Sweet potatoes   Blueberries, Strawberries   Dark tea (Green tea is okay)  Tofu      DRINK 3 QUARTS (96 Oz ) LIQUIDS EACH DAY - ALL LIQUIDS COUNT        ADD LEMON JUICE 3 OZ  DAILY - Fresh squeezed or lemon juice concentrate - Not MinuteMaid, [place]  [place] ([facility]) Hill, Crystal Lite, etc         ** Recipe for ANDRE'S OLDOFE TYME LEMONADE:         One ounce of lemon juice, glass of water, sweetener of your choice    Coffee is okay! Cranberry juice is good to prevent infections, but does not help for stones  BARE-BONES RECOMMENDATIONS:  Fluids, fluids, fluids  Low salt diet (your primary care doctor will love you)  Moderate red meat intake  Moderate calcium (dairy products), especially with meals  Hematospermia   WHAT YOU NEED TO KNOW:   What is hematospermia and what causes it? Hematospermia is blood in your semen  The cause of hematospermia may be unknown, and it may go away on its own  It may be caused by an infection or a recent procedure such as a prostate biopsy or vasectomy  It may also be caused by being abstinent (no sexual activity) for a long time  Rarely, hematospermia may be caused by cancer of the prostate, bladder, or testicles  How is hematospermia diagnosed? Your healthcare provider will examine you   He or she may do a digital rectal exam to check the size and shape of your prostate  Your provider will ask about any medical conditions or recent procedures you have had  Your provider will also ask if you have any other signs or symptoms  You may need blood and urine tests to find the cause of your hematospermia  You may be referred to a urologist for more tests to find the cause of your hematospermia  How is hematospermia treated? Treatment depends on the cause of your hematospermia  You may not need any treatment  You may need antibiotics to treat a bacterial infection  When should I contact my healthcare provider? You continue to see blood in your semen  You have trouble urinating  You see blood in your urine  You develop any new symptoms  You have questions or concerns about your condition or care  CARE AGREEMENT:   You have the right to help plan your care  Learn about your health condition and how it may be treated  Discuss treatment options with your healthcare providers to decide what care you want to receive  You always have the right to refuse treatment  The above information is an  only  It is not intended as medical advice for individual conditions or treatments  Talk to your doctor, nurse or pharmacist before following any medical regimen to see if it is safe and effective for you  © Copyright [employer] 2022 Information is for End User's use only and may not be sold, redistributed or otherwise used for commercial purposes

## 2023-06-09 ENCOUNTER — HOSPITAL ENCOUNTER (OUTPATIENT)
Dept: RADIOLOGY | Facility: HOSPITAL | Age: 48
Discharge: HOME | End: 2023-06-09
Payer: COMMERCIAL

## 2023-06-09 DIAGNOSIS — N20.0 NEPHROLITHIASIS: ICD-10-CM

## 2023-06-09 DIAGNOSIS — E27.8 ADRENAL NODULE (HCC): ICD-10-CM

## 2023-06-09 DIAGNOSIS — R10.9 FLANK PAIN: ICD-10-CM

## 2023-06-09 PROCEDURE — 74176 CT ABD & PELVIS W/O CONTRAST: CPT

## 2023-06-09 PROCEDURE — G1004 CDSM NDSC: HCPCS

## 2023-06-16 ENCOUNTER — TELEPHONE (OUTPATIENT)
Dept: UROLOGY | Facility: CLINIC | Age: 48
End: 2023-06-16

## 2023-06-16 ENCOUNTER — OFFICE VISIT (OUTPATIENT)
Dept: BARIATRICS | Facility: CLINIC | Age: 48
End: 2023-06-16
Payer: COMMERCIAL

## 2023-06-16 VITALS
HEIGHT: 71 IN | BODY MASS INDEX: 36.15 KG/M2 | RESPIRATION RATE: 16 BRPM | SYSTOLIC BLOOD PRESSURE: 118 MMHG | WEIGHT: 258.2 LBS | HEART RATE: 72 BPM | DIASTOLIC BLOOD PRESSURE: 78 MMHG

## 2023-06-16 DIAGNOSIS — E78.00 HYPERCHOLESTEROLEMIA: ICD-10-CM

## 2023-06-16 DIAGNOSIS — G47.33 OSA (OBSTRUCTIVE SLEEP APNEA): ICD-10-CM

## 2023-06-16 DIAGNOSIS — E66.9 OBESITY, CLASS II, BMI 35-39.9: Primary | ICD-10-CM

## 2023-06-16 DIAGNOSIS — E11.9 TYPE 2 DIABETES MELLITUS WITHOUT COMPLICATION, WITHOUT LONG-TERM CURRENT USE OF INSULIN (HCC): ICD-10-CM

## 2023-06-16 PROBLEM — E66.812 OBESITY, CLASS II, BMI 35-39.9: Status: ACTIVE | Noted: 2023-06-16

## 2023-06-16 PROCEDURE — 99244 OFF/OP CNSLTJ NEW/EST MOD 40: CPT | Performed by: NURSE PRACTITIONER

## 2023-06-16 NOTE — ASSESSMENT & PLAN NOTE
- Taking Pravastatin  May improve with weight loss and lifestyle modification  Continue management with prescribing provider

## 2023-06-16 NOTE — ASSESSMENT & PLAN NOTE
- Discussed options of HealthyCORE-Intensive Lifestyle Intervention Program, Very Low Calorie Diet-VLCD, Conservative Program, Bari-En-Y Gastric Bypass and Vertical Sleeve Gastrectomy and the role of weight loss medications   - Not interested in weight loss surgery   - Not a candidate for VLCD due to history of gout  - Explained the importance of making lifestyle changes with anti-obesity medications  - Patient will take some time to think about the programs and update me when he has reached a decision    - FDA approved weight loss medications reviewed: Wegovy, Saxenda, Qsymia, Contrave, and Phentermine  Reviewed current [facility]    - Not a candidate for Qsymia or Topamax due to nephrolithiasis  - Patient denies personal history of pancreatitis  Patient also denies personal and family history of thyroid cancer and multiple endocrine neoplasia type 2 (MEN 2 tumor)  - Can also consider Ozempic given type 2 diabetes  - Already on Metformin through primary care  - Initial weight loss goal of 5-10% weight loss for improved health  - Weight loss can improve patient's co-morbid conditions and/or prevent weight-related complications  - Labs reviewed: A1C, TSH, CMP, and CBC 9/28/2022  A1C controlled at 6 7  Fasting glucose, AST/ALT elevated, which will all likely improve with weight loss  Remainder of the blood work was within acceptable range  Goals:  Do not skip meals  Food log (ie ) www myfitnesspal com,sparkpeople  com,loseit com,calorieking  com,etc  baritastic (use skinnytaste  com, dietdoctor  com or smartphone ted [employer] for recipes)  No sugary beverages  At least 64oz of water daily  Increase physical activity by 10 minutes daily  Gradually increase physical activity to a goal of 5 days per week for 30 minutes of MODERATE intensity PLUS 2 days per week of FULL BODY resistance training (use smartphone apps [employer], etc )  Reviewed food logging  2000 calories per day   Sample menu given

## 2023-06-16 NOTE — PROGRESS NOTES
Assessment/Plan:    Obesity, Class II, BMI 35-39 9  - Discussed options of HealthyCORE-Intensive Lifestyle Intervention Program, Very Low Calorie Diet-VLCD, Conservative Program, Bari-En-Y Gastric Bypass and Vertical Sleeve Gastrectomy and the role of weight loss medications   - Not interested in weight loss surgery   - Not a candidate for VLCD due to history of gout  - Explained the importance of making lifestyle changes with anti-obesity medications  - Patient will take some time to think about the programs and update me when he has reached a decision    - FDA approved weight loss medications reviewed: Wegovy, Saxenda, Qsymia, Contrave, and Phentermine  Reviewed current [facility]    - Not a candidate for Qsymia or Topamax due to nephrolithiasis  - Patient denies personal history of pancreatitis  Patient also denies personal and family history of thyroid cancer and multiple endocrine neoplasia type 2 (MEN 2 tumor)  - Can also consider Ozempic given type 2 diabetes  - Already on Metformin through primary care  - Initial weight loss goal of 5-10% weight loss for improved health  - Weight loss can improve patient's co-morbid conditions and/or prevent weight-related complications  - Labs reviewed: A1C, TSH, CMP, and CBC 9/28/2022  A1C controlled at 6 7  Fasting glucose, AST/ALT elevated, which will all likely improve with weight loss  Remainder of the blood work was within acceptable range  Goals:  Do not skip meals  Food log (ie ) www myfitnesspal com,sparkpeople  com,loseit com,calorieking  com,etc  baritastic (use skinnytaste  com, dietdoctor  com or smartphone ted [employer] for recipes)  No sugary beverages  At least 64oz of water daily  Increase physical activity by 10 minutes daily   Gradually increase physical activity to a goal of 5 days per week for 30 minutes of MODERATE intensity PLUS 2 days per week of FULL BODY resistance training (use smartphone apps [employer], etc )  Reviewed food logging  2000 calories per day  Sample menu given  Type 2 diabetes mellitus without complication, without long-term current use of insulin (HCC)  - Taking metformin  May improve with weight loss and lifestyle modification  Continue management with prescribing provider  Lab Results   Component Value Date    HGBA1C 6 7 (H) 09/28/2022       RADHA (obstructive sleep apnea)  - Continue regular use of CPAP  Hypercholesterolemia  - Taking Pravastatin  May improve with weight loss and lifestyle modification  Continue management with prescribing provider  Tootietiesha Nieves was seen today for consult  Diagnoses and all orders for this visit:    Obesity, Class II, BMI 35-39 9    Type 2 diabetes mellitus without complication, without long-term current use of insulin (HCC)    RADHA (obstructive sleep apnea)    Hypercholesterolemia        Total time spent: 40 min, with >50% face-to-face time spent counseling patient on nonsurgical interventions for the treatment of excess weight  Discussed in detail nonsurgical options including intensive lifestyle intervention program, very low-calorie diet program and conservative program   Discussed the role of weight loss medications  Counseled patient on diet behavior and exercise modification for weight loss  Follow up in approximately 4 months with Non-Surgical Physician/Advanced Practitioner  Subjective:   Chief Complaint   Patient presents with   • Consult     MWM consult; waist 44 5in; goal wt 220; pt has sleep apnea       Patient ID: Yesenia Kc  is a 46 y o  male with excess weight/obesity here to pursue weight management  Previous notes and records have been reviewed      Past Medical History:   Diagnosis Date   • Allergic    • Diabetes mellitus [facility]) March 2022   • Gout    • Hyperlipidemia    • Kidney stone    • Palpitations    • Sleep apnea      Past Surgical History:   Procedure Laterality Date   • URETERAL STENT PLACEMENT  2003   • WISDOM TOOTH EXTRACTION 1980       HPI:  Wt Readings from Last 20 Encounters:   06/16/23 117 kg (258 lb 3 2 oz)   05/31/23 117 kg (259 lb)   03/24/23 117 kg (259 lb)   09/16/22 117 kg (258 lb 12 8 oz)   04/23/22 113 kg (250 lb)   03/11/22 119 kg (262 lb 6 4 oz)   09/10/21 120 kg (264 lb)     Obesity/Excess Weight:  Severity: Moderate  Onset: Many years    Modifiers: Diet and Exercise and Commercial Weight Loss Programs-ie  Weight Watchers, Beatris [place], Nutrisystem, etc   Contributing factors: Insufficient Physical Activity and Insufficient time to make appropriate lifestyle changes  Associated symptoms: comorbid conditions and increased joint pain    Hydration: 60 oz water, 1 cup coffee - plain, rare soda  Alcohol: 1 drink per month  Smoking: denies  Exercise: walks once per week for 30 minutes   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    Sleep: 7 hours  STOP bang: Has RADHA, uses CPAP regularly     Highest weight: 260 lbs   Current weight: 258 1 lbs BMI 36 01  Goal weight: 220 lbs     Colonoscopy: UTD, gets cologuard due Oct 2024    The following portions of the patient's history were reviewed and updated as appropriate: allergies, current medications, past family history, past medical history, past social history, past surgical history, and problem list     Family History   Problem Relation Age of Onset   • Hypertension Mother    • Hyperlipidemia Mother    • Hypertension Father    • Hyperlipidemia Father    • Depression Sister    • Stroke Maternal Uncle    • Diabetes Paternal Uncle    • Cancer Paternal Grandmother         pancreatic cancer   • Heart disease Neg Hx    • Thyroid disease Neg Hx         Review of Systems   HENT: Negative for sore throat  Respiratory: Negative for cough and shortness of breath  Cardiovascular: Negative for chest pain and palpitations  Gastrointestinal: Negative for constipation, diarrhea, nausea and vomiting          Denies GERD   Endocrine: Negative for cold intolerance and heat "intolerance  Genitourinary: Negative for dysuria  Musculoskeletal: Positive for arthralgias (intermittent)  Negative for back pain  Skin: Negative for rash  Neurological: Negative for headaches  Psychiatric/Behavioral: Negative for suicidal ideas (or HI)  Denies depression and anxiety       Objective:  /78   Pulse 72   Resp 16   Ht 5' 11\" (1 803 m)   Wt 117 kg (258 lb 3 2 oz)   BMI 36 01 kg/m²     Physical Exam  Vitals and nursing note reviewed  Constitutional   General appearance: Abnormal   well developed and obese  Eyes No conjunctival injection  Ears, Nose, Mouth, and Throat Oral mucosa moist    Pulmonary   Respiratory effort: No increased work of breathing or signs of respiratory distress  Cardiovascular     Examination of extremities for edema and/or varicosities: Normal   no edema  Abdomen   Abdomen: Abnormal   The abdomen was obese      Musculoskeletal   Normal range of motion  Neurological   Gait and station: Normal     Psychiatric   Orientation to person, place and time: Normal     Affect: appropriate        "

## 2023-06-16 NOTE — TELEPHONE ENCOUNTER
----- Message from Brianne Luo PA-C sent at 6/16/2023  9:27 AM EDT -----  Pau Lopez last week  Good news CT scan shows passage of last year's ureteral stone  No sizable stones remaining  Tiny calcification in right kidney with no obstruction/hydro  Also the right adrenal nodule is stable/unchanged  Once he has the labs we will compare  Overall scan looks good  Would favor non- source of his back pain

## 2023-06-16 NOTE — ASSESSMENT & PLAN NOTE
- Taking metformin  May improve with weight loss and lifestyle modification  Continue management with prescribing provider         Lab Results   Component Value Date    HGBA1C 6 7 (H) 09/28/2022

## 2023-06-26 DIAGNOSIS — E27.8 ADRENAL NODULE (HCC): ICD-10-CM

## 2023-06-27 RX ORDER — DEXAMETHASONE 1 MG
TABLET ORAL
Qty: 1 TABLET | Refills: 0 | Status: SHIPPED | OUTPATIENT
Start: 2023-06-27

## 2023-06-30 ENCOUNTER — APPOINTMENT (OUTPATIENT)
Dept: LAB | Facility: CLINIC | Age: 48
End: 2023-06-30
Payer: COMMERCIAL

## 2023-06-30 DIAGNOSIS — R36.1 HEMATOSPERMIA: ICD-10-CM

## 2023-06-30 DIAGNOSIS — N20.0 NEPHROLITHIASIS: ICD-10-CM

## 2023-06-30 DIAGNOSIS — E27.8 ADRENAL NODULE (HCC): ICD-10-CM

## 2023-06-30 DIAGNOSIS — M10.9 GOUT, UNSPECIFIED CAUSE, UNSPECIFIED CHRONICITY, UNSPECIFIED SITE: ICD-10-CM

## 2023-06-30 DIAGNOSIS — E11.9 TYPE 2 DIABETES MELLITUS WITHOUT COMPLICATION, WITHOUT LONG-TERM CURRENT USE OF INSULIN (HCC): ICD-10-CM

## 2023-06-30 DIAGNOSIS — Z12.5 SCREENING PSA (PROSTATE SPECIFIC ANTIGEN): ICD-10-CM

## 2023-06-30 LAB
ALBUMIN SERPL BCP-MCNC: 4 G/DL (ref 3.5–5)
ALP SERPL-CCNC: 43 U/L (ref 46–116)
ALT SERPL W P-5'-P-CCNC: 100 U/L (ref 12–78)
ANION GAP SERPL CALCULATED.3IONS-SCNC: 4 MMOL/L
AST SERPL W P-5'-P-CCNC: 47 U/L (ref 5–45)
BACTERIA UR QL AUTO: ABNORMAL /HPF
BILIRUB SERPL-MCNC: 0.23 MG/DL (ref 0.2–1)
BILIRUB UR QL STRIP: NEGATIVE
BUN SERPL-MCNC: 16 MG/DL (ref 5–25)
CALCIUM SERPL-MCNC: 9.3 MG/DL (ref 8.3–10.1)
CHLORIDE SERPL-SCNC: 109 MMOL/L (ref 96–108)
CHOLEST SERPL-MCNC: 217 MG/DL
CLARITY UR: CLEAR
CO2 SERPL-SCNC: 28 MMOL/L (ref 21–32)
COLOR UR: ABNORMAL
CREAT SERPL-MCNC: 1.33 MG/DL (ref 0.6–1.3)
CREAT UR-MCNC: 137 MG/DL
ERYTHROCYTE [DISTWIDTH] IN BLOOD BY AUTOMATED COUNT: 12.8 % (ref 11.6–15.1)
EST. AVERAGE GLUCOSE BLD GHB EST-MCNC: 148 MG/DL
GFR SERPL CREATININE-BSD FRML MDRD: 61 ML/MIN/1.73SQ M
GLUCOSE P FAST SERPL-MCNC: 154 MG/DL (ref 65–99)
GLUCOSE UR STRIP-MCNC: NEGATIVE MG/DL
HBA1C MFR BLD: 6.8 %
HCT VFR BLD AUTO: 48.4 % (ref 36.5–49.3)
HDLC SERPL-MCNC: 35 MG/DL
HGB BLD-MCNC: 15.9 G/DL (ref 12–17)
HGB UR QL STRIP.AUTO: NEGATIVE
KETONES UR STRIP-MCNC: NEGATIVE MG/DL
LDLC SERPL DIRECT ASSAY-MCNC: 118 MG/DL (ref 0–100)
LEUKOCYTE ESTERASE UR QL STRIP: NEGATIVE
MCH RBC QN AUTO: 30.4 PG (ref 26.8–34.3)
MCHC RBC AUTO-ENTMCNC: 32.9 G/DL (ref 31.4–37.4)
MCV RBC AUTO: 93 FL (ref 82–98)
MICROALBUMIN UR-MCNC: 7.6 MG/L (ref 0–20)
MICROALBUMIN/CREAT 24H UR: 6 MG/G CREATININE (ref 0–30)
MUCOUS THREADS UR QL AUTO: ABNORMAL
NITRITE UR QL STRIP: NEGATIVE
NON-SQ EPI CELLS URNS QL MICRO: ABNORMAL /HPF
PH UR STRIP.AUTO: 5.5 [PH]
PLATELET # BLD AUTO: 270 THOUSANDS/UL (ref 149–390)
PMV BLD AUTO: 10.8 FL (ref 8.9–12.7)
POTASSIUM SERPL-SCNC: 4.6 MMOL/L (ref 3.5–5.3)
PROT SERPL-MCNC: 7.6 G/DL (ref 6.4–8.4)
PROT UR STRIP-MCNC: NEGATIVE MG/DL
PSA SERPL-MCNC: 2.52 NG/ML (ref 0–4)
RBC # BLD AUTO: 5.23 MILLION/UL (ref 3.88–5.62)
RBC #/AREA URNS AUTO: ABNORMAL /HPF
SODIUM SERPL-SCNC: 141 MMOL/L (ref 135–147)
SP GR UR STRIP.AUTO: 1.02 (ref 1–1.03)
TRIGL SERPL-MCNC: 505 MG/DL
TSH SERPL DL<=0.05 MIU/L-ACNC: 2.64 UIU/ML (ref 0.45–4.5)
URATE SERPL-MCNC: 6.7 MG/DL (ref 3.5–8.5)
UROBILINOGEN UR STRIP-ACNC: <2 MG/DL
WBC # BLD AUTO: 5.86 THOUSAND/UL (ref 4.31–10.16)
WBC #/AREA URNS AUTO: ABNORMAL /HPF

## 2023-06-30 PROCEDURE — 80053 COMPREHEN METABOLIC PANEL: CPT

## 2023-06-30 PROCEDURE — 84443 ASSAY THYROID STIM HORMONE: CPT

## 2023-06-30 PROCEDURE — 80061 LIPID PANEL: CPT

## 2023-06-30 PROCEDURE — 84550 ASSAY OF BLOOD/URIC ACID: CPT

## 2023-06-30 PROCEDURE — 83036 HEMOGLOBIN GLYCOSYLATED A1C: CPT

## 2023-06-30 PROCEDURE — 36415 COLL VENOUS BLD VENIPUNCTURE: CPT

## 2023-06-30 PROCEDURE — 81001 URINALYSIS AUTO W/SCOPE: CPT

## 2023-06-30 PROCEDURE — 83835 ASSAY OF METANEPHRINES: CPT

## 2023-06-30 PROCEDURE — 85027 COMPLETE CBC AUTOMATED: CPT

## 2023-06-30 PROCEDURE — 83721 ASSAY OF BLOOD LIPOPROTEIN: CPT

## 2023-06-30 PROCEDURE — G0103 PSA SCREENING: HCPCS

## 2023-06-30 PROCEDURE — 82088 ASSAY OF ALDOSTERONE: CPT

## 2023-06-30 PROCEDURE — 84244 ASSAY OF RENIN: CPT

## 2023-06-30 PROCEDURE — 87086 URINE CULTURE/COLONY COUNT: CPT

## 2023-07-01 LAB — BACTERIA UR CULT: NORMAL

## 2023-07-03 DIAGNOSIS — E78.00 HYPERCHOLESTEROLEMIA: Primary | ICD-10-CM

## 2023-07-03 DIAGNOSIS — E78.00 HYPERCHOLESTEROLEMIA: ICD-10-CM

## 2023-07-03 DIAGNOSIS — M10.9 GOUT, UNSPECIFIED CAUSE, UNSPECIFIED CHRONICITY, UNSPECIFIED SITE: ICD-10-CM

## 2023-07-03 RX ORDER — ICOSAPENT ETHYL 1000 MG/1
2 CAPSULE ORAL 2 TIMES DAILY
Qty: 180 CAPSULE | Refills: 5 | Status: SHIPPED | OUTPATIENT
Start: 2023-07-03

## 2023-07-03 RX ORDER — PRAVASTATIN SODIUM 10 MG
10 TABLET ORAL
Qty: 90 TABLET | Refills: 0 | Status: SHIPPED | OUTPATIENT
Start: 2023-07-03

## 2023-07-03 RX ORDER — ALLOPURINOL 100 MG/1
200 TABLET ORAL DAILY
Qty: 180 TABLET | Refills: 0 | Status: SHIPPED | OUTPATIENT
Start: 2023-07-03

## 2023-07-05 LAB — ALDOST SERPL-MCNC: 8.2 NG/DL (ref 0–30)

## 2023-07-06 LAB
METANEPH FREE SERPL-MCNC: 27 PG/ML (ref 0–88)
NORMETANEPHRINE SERPL-MCNC: 59.8 PG/ML (ref 0–218.9)

## 2023-07-14 LAB — RENIN PLAS-CCNC: 0.84 NG/ML/HR (ref 0.17–5.38)

## 2023-07-26 ENCOUNTER — PATIENT MESSAGE (OUTPATIENT)
Dept: BARIATRICS | Facility: CLINIC | Age: 48
End: 2023-07-26

## 2023-07-27 ENCOUNTER — PATIENT MESSAGE (OUTPATIENT)
Dept: BARIATRICS | Facility: CLINIC | Age: 48
End: 2023-07-27

## 2023-07-27 DIAGNOSIS — E11.69 DIABETES MELLITUS TYPE 2 IN OBESE (HCC): Primary | ICD-10-CM

## 2023-07-27 DIAGNOSIS — E66.9 DIABETES MELLITUS TYPE 2 IN OBESE (HCC): Primary | ICD-10-CM

## 2023-07-27 NOTE — TELEPHONE ENCOUNTER
Arianne Choudhary 7/26/2023 2:41 PM EDT      ----- Message -----  From: Julian Simmonds  Sent: 7/26/2023 2:38 PM EDT  To: *  Subject: medication and weight plan     Hi! We had a chance to meet several weeks ago, at which time we discussed potential use of medications in addressing weight, as well as the weight plan options. I'm interested in pursuing with the Health Core weight plan. I am also interested in pursuing with a trial of either Ozempic versus Wegovy, in addressing not only weight but also my diabetes mellitus. Please let me know what steps I will need to pursue with next. Thank you!

## 2023-07-27 NOTE — TELEPHONE ENCOUNTER
Dariat, Generic 7/27/2023 1:19 PM EDT    Hi! Thanks for your quick response. The prescription can be sent to the [address] in [facility]. I'll await the phone call regarding signing up for Healthy Core.  Thanks again -- Ally Workman

## 2023-07-28 ENCOUNTER — TELEPHONE (OUTPATIENT)
Dept: BARIATRICS | Facility: CLINIC | Age: 48
End: 2023-07-28

## 2023-07-30 PROBLEM — Z12.5 PROSTATE CANCER SCREENING: Status: RESOLVED | Noted: 2023-05-31 | Resolved: 2023-07-30

## 2023-08-21 NOTE — PROGRESS NOTES
Weight Management Medical Nutrition Assessment  Lona Alejandro presented for the [employer] with the Healthy CORE Program. Goal to be healthier. Today's weight is 253.6#. Reports recent dietary changes. Patient reduced intake of sweet snacks and carbohydrates. At this time patient does not count carbohydrates. Per dietary recall patient consumes excess calories from carbohydrate portions. Reviewed carbohydrate goals for blood sugar control. Discussed snacks with protein and importance of protein for fullness. Patient interested in food logging. Provided estimated needs and portion size recommendations. Body composition completed. Will review results at f/u. We developed and reviewed a low calorie meal plan.      Patient seen by Medical Provider in past 6 months:  yes  Requested to schedule appointment with Medical Provider: No      Anthropometric Measurements  Start Weight (#): 253.6#  Current Weight (#): 253.6#  TBW % Change from start weight: n/a  Ideal Body Weight (#): 172#  Goal Weight (#): 220#  Highest: 260#  Lowest:    Weight Loss History  Previous weight loss attempts: Commercial Programs ([employer], Jennifer Ramon, etc.)  Exercise  Self Created Diets (Portion Control, Healthy Food Choices, etc.)    Food and Nutrition Related History  Wake up: 7:30/8AM  Bed Time: n/a    Food Recall  Breakfast: 8:15AM muffin and banana OR yogurt with granola and blueberries    Snack: skip  Lunch: 12PM leftovers OR [employer] (has not dined out recently)  Snack: 4PM cracker OR donny strips  Dinner: 6PM pasta OR soup OR 6oz salmon, 2 cups white rice, and 1 cup vegetable   Snack: bag of chips OR grapes OR grapefruit OR ice cream     [facility]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     Beverages: 64oz water, ICEE drink  Alcohol: none  Volume of beverage intake: 76oz    Weekends: Same  Cravings: carbohydrates  Trouble area of day: evening (snacking)    Frequency of Eating out: 3 times per week  Food restrictions: dairy (lactose intolerant), shellfish   Cooking: wife  Food Shopping: self or wife    Physical Activity Intake  Activity: Walking  Frequency:occasionally  Physical limitations/barriers to exercise: none    Estimated Needs  Energy  SECA: BMR: 2193  [employer] [place] [employer] Needs: BMR : 2052   1-2# loss weekly sedentary: 7798-7767            1-2# loss weekly lightly active: 5642-0143  Maintenance calories for sedentary activity level: 2463  Protein: 94-117g      (1.2-1.5g/kg IBW)  Fluid: 92oz     (35mL/kg IBW)    Nutrition Diagnosis  Yes; Overweight/obesity  related to Excess energy intake as evidenced by  BMI more than normative standard for age and sex (obesity-grade II 35-39. 9)       Nutrition Intervention    Nutrition Prescription  Calories: 1800 calories  Protein: 105g  Fluid: 92oz    Meal Plan (Eddie/Pro/Carb)  Breakfast: 300/400/25/30-45  Snack:  Lunch: 500/30/30-45  Snack: 200/5-10/15-20  Dinner: 500/30/30-45  Snack: 200/5-10/15-20    Nutrition Education:    Healthy Core Manual  Calorie controlled menu  Lean protein food choices  Healthy snack options  Food journaling tips      Nutrition Counseling:  Strategies: meal planning, portion sizes, healthy snack choices, hydration, fiber intake, protein intake, exercise, food journal      Monitoring and Evaluation:  Evaluation criteria:  Energy Intake  Meet protein needs  Maintain adequate hydration  Monitor weekly weight  Meal planning/preparation  Food journal   Decreased portions at mealtimes and snacks  Physical activity     Barriers to learning:none  Readiness to change: Preparation:  (Getting ready to change)   Comprehension: good  Expected Compliance: good

## 2023-08-25 ENCOUNTER — OFFICE VISIT (OUTPATIENT)
Dept: BARIATRICS | Facility: CLINIC | Age: 48
End: 2023-08-25
Payer: COMMERCIAL

## 2023-08-25 ENCOUNTER — TELEPHONE (OUTPATIENT)
Dept: BARIATRICS | Facility: CLINIC | Age: 48
End: 2023-08-25

## 2023-08-25 VITALS — HEIGHT: 71 IN | WEIGHT: 253.6 LBS | BODY MASS INDEX: 35.5 KG/M2

## 2023-08-25 DIAGNOSIS — E66.01 CLASS 2 SEVERE OBESITY DUE TO EXCESS CALORIES WITH SERIOUS COMORBIDITY AND BODY MASS INDEX (BMI) OF 35.0 TO 35.9 IN ADULT (HCC): ICD-10-CM

## 2023-08-25 PROCEDURE — 97802 MEDICAL NUTRITION INDIV IN: CPT

## 2023-08-25 PROCEDURE — RECHECK

## 2023-08-25 NOTE — TELEPHONE ENCOUNTER
RD scheduling error. Patient interested in meeting with RD at 1:30PM for month 1 HC f/u and SW at 2:00PM for [facility] visit on 9/29/23. RD unable to schedule as patient has visit with other provider. Patient will contact office or Kelsey Esteban to schedule visits.

## 2023-08-30 ENCOUNTER — OFFICE VISIT (OUTPATIENT)
Dept: FAMILY MEDICINE CLINIC | Facility: HOSPITAL | Age: 48
End: 2023-08-30
Payer: COMMERCIAL

## 2023-08-30 VITALS
HEIGHT: 71 IN | TEMPERATURE: 98.1 F | BODY MASS INDEX: 35.84 KG/M2 | HEART RATE: 79 BPM | DIASTOLIC BLOOD PRESSURE: 82 MMHG | WEIGHT: 256 LBS | SYSTOLIC BLOOD PRESSURE: 130 MMHG

## 2023-08-30 DIAGNOSIS — M25.561 RIGHT KNEE PAIN, UNSPECIFIED CHRONICITY: ICD-10-CM

## 2023-08-30 DIAGNOSIS — E11.9 TYPE 2 DIABETES MELLITUS WITHOUT COMPLICATION, WITHOUT LONG-TERM CURRENT USE OF INSULIN (HCC): ICD-10-CM

## 2023-08-30 DIAGNOSIS — G47.33 OSA (OBSTRUCTIVE SLEEP APNEA): Primary | ICD-10-CM

## 2023-08-30 PROCEDURE — 99214 OFFICE O/P EST MOD 30 MIN: CPT | Performed by: FAMILY MEDICINE

## 2023-08-30 NOTE — PROGRESS NOTES
Name: Alisa Gaviria      : 1975      MRN: [id number]  Encounter Provider: Alanna Lin MD  Encounter Date: 2023   Encounter department: [address]     1. RADHA (obstructive sleep apnea)    2. Type 2 diabetes mellitus without complication, without long-term current use of insulin ([address])  -     IRIS Diabetic eye exam    3. Right knee pain, unspecified chronicity       radha. Doing well. But will be needing supplies. Needing sleep study results or new sleep study to get insurance to cover this. He will let me know. Dm. Stable. Going to weight loss center. Continues on metformin. Holding off on use of ozempic     Right knee pain. Mild. Improving over the last 3 weeks. Nsaids, topical nsaids as needed. With worsening or no resolutions to consider imaging studies , consideration of Orthopedics. Subjective      Pt here for right knee pain. Started about 6 months ago. No trauma, no fall, no injury pain in the lateral side of the patella. No swelling , no erythema. However over the past 3weeks seems to be getting better. The more concerning part is that seems to be a small area of numbness on the side of that patella. DM. Doing okay. Has been going to weight loss center. Working on dietary control. RADHA. Stable. But has not been able to find old sleep study results. Will need this to get approval for renewal of supplies. Knee Pain       Review of Systems   Constitutional: Negative. Negative for activity change, appetite change, chills and diaphoresis. HENT: Negative for congestion and dental problem. Respiratory: Negative. Negative for apnea, chest tightness, shortness of breath and wheezing. Cardiovascular: Negative. Negative for chest pain, palpitations and leg swelling. Gastrointestinal: Negative. Negative for abdominal distention, abdominal pain, constipation, diarrhea and nausea. Genitourinary: Negative. Negative for difficulty urinating, dysuria and frequency. Current Outpatient Medications on File Prior to Visit   Medication Sig   • allopurinol (ZYLOPRIM) 100 mg tablet Take 2 tablets (200 mg total) by mouth daily   • ASPIRIN 81 PO    • Icosapent Ethyl (Vascepa) 1 g CAPS Take 2 capsules (2 g total) by mouth 2 (two) times a day   • metFORMIN (GLUCOPHAGE-XR) 500 mg 24 hr tablet TAKE TWO TABLETS BY MOUTH DAILY WITH DINNER   • Multiple Vitamins-Minerals (MULTIVITAMIN MEN PO)    • pravastatin (PRAVACHOL) 10 mg tablet Take 1 tablet (10 mg total) by mouth daily at bedtime   • Fluticasone Propionate (FLONASE NA) into each nostril (Patient not taking: Reported on 8/30/2023)   • semaglutide, 0.25 or 0.5 mg/dose, (Ozempic, 0.25 or 0.5 MG/DOSE,) 2 mg/3 mL injection pen Take 0.25 mg subcutaneously once a week for 4 weeks then increase to 0.5 mg subcutaneously once a week. (Patient not taking: Reported on 8/30/2023)       Objective     /82   Pulse 79   Temp 98.1 °F (36.7 °C)   Ht 5' 11" (1.803 m)   Wt 116 kg (256 lb)   BMI 35.70 kg/m²     Physical Exam  Vitals and nursing note reviewed. Constitutional:       Appearance: Normal appearance. HENT:      Head: Normocephalic. Cardiovascular:      Rate and Rhythm: Normal rate and regular rhythm. Pulses: Normal pulses. no weak pulses          Dorsalis pedis pulses are 2+ on the right side and 2+ on the left side. Posterior tibial pulses are 2+ on the right side and 2+ on the left side. Heart sounds: Normal heart sounds. Pulmonary:      Breath sounds: Normal breath sounds. Feet:      Right foot:      Skin integrity: No ulcer, skin breakdown, erythema, warmth, callus or dry skin. Left foot:      Skin integrity: No ulcer, skin breakdown, erythema, warmth, callus or dry skin. Neurological:      General: No focal deficit present. Mental Status: He is alert and oriented to person, place, and time. Psychiatric:         Mood and Affect: Mood normal.         Behavior: Behavior normal.     Diabetic Foot Exam    Patient's shoes and socks removed. Right Foot/Ankle   Right Foot Inspection  Skin Exam: skin normal and skin intact. No dry skin, no warmth, no callus, no erythema, no maceration, no abnormal color, no pre-ulcer, no ulcer and no callus. Toe Exam: ROM and strength within normal limits. Sensory   Vibration: intact  Proprioception: intact  Monofilament testing: intact    Vascular  Capillary refills: < 3 seconds  The right DP pulse is 2+. The right PT pulse is 2+. Left Foot/Ankle  Left Foot Inspection  Skin Exam: skin normal and skin intact. No dry skin, no warmth, no erythema, no maceration, normal color, no pre-ulcer, no ulcer and no callus. Toe Exam: ROM and strength within normal limits. Sensory   Vibration: intact  Proprioception: intact  Monofilament testing: intact    Vascular  Capillary refills: < 3 seconds  The left DP pulse is 2+. The left PT pulse is 2+.      Assign Risk Category  No deformity present  No loss of protective sensation  No weak pulses  Risk: 0    Leighton Hernandez MD

## 2023-08-31 LAB
LEFT EYE DIABETIC RETINOPATHY: ABNORMAL
LEFT EYE IMAGE QUALITY: ABNORMAL
LEFT EYE MACULAR EDEMA: ABNORMAL
LEFT EYE OTHER RETINOPATHY: ABNORMAL
RIGHT EYE DIABETIC RETINOPATHY: ABNORMAL
RIGHT EYE IMAGE QUALITY: ABNORMAL
RIGHT EYE MACULAR EDEMA: ABNORMAL
RIGHT EYE OTHER RETINOPATHY: ABNORMAL
SEVERITY (EYE EXAM): ABNORMAL

## 2023-09-14 ENCOUNTER — CLINICAL SUPPORT (OUTPATIENT)
Dept: BARIATRICS | Facility: CLINIC | Age: 48
End: 2023-09-14

## 2023-09-14 VITALS — BODY MASS INDEX: 35.11 KG/M2 | WEIGHT: 250.8 LBS | HEIGHT: 71 IN

## 2023-09-14 DIAGNOSIS — R63.5 ABNORMAL WEIGHT GAIN: Primary | ICD-10-CM

## 2023-09-14 PROCEDURE — RECHECK

## 2023-09-21 ENCOUNTER — CLINICAL SUPPORT (OUTPATIENT)
Dept: BARIATRICS | Facility: CLINIC | Age: 48
End: 2023-09-21

## 2023-09-21 VITALS — HEIGHT: 71 IN | WEIGHT: 253.4 LBS | BODY MASS INDEX: 35.48 KG/M2

## 2023-09-21 DIAGNOSIS — R63.5 ABNORMAL WEIGHT GAIN: Primary | ICD-10-CM

## 2023-09-21 PROCEDURE — RECHECK

## 2023-09-24 DIAGNOSIS — E78.00 HYPERCHOLESTEROLEMIA: ICD-10-CM

## 2023-09-24 RX ORDER — PRAVASTATIN SODIUM 10 MG
10 TABLET ORAL
Qty: 90 TABLET | Refills: 0 | Status: SHIPPED | OUTPATIENT
Start: 2023-09-24

## 2023-09-25 NOTE — PROGRESS NOTES
Weight Management Medical Nutrition Assessment  Shreya Leslie presented for month 1 f/u with the Healthy [facility]ORE Program. Today's weight is 250.6#. He lost 3# x 4 weeks (1.2%TBW). Calorie counting has been a challenge especially with home cooked meals. He has reduced portion sizes. Reduced dining out at lunch time and is packing meals. His wife plans to join program and he is looking forward to the support. Reviewed body composition results. Patient seen by Medical Provider in past 6 months:  yes  Requested to schedule appointment with Medical Provider: No      Anthropometric Measurements  Start Weight (#): 253.6#  Current Weight (#): 250.6#  TBW % Change from start weight: 1.2%  Ideal Body Weight (#): 172#  Goal Weight (#): 220#  Highest: 260#  Lowest:    Weight Loss History  Previous weight loss attempts: Commercial Programs ([employer], Gene Feeler, etc.)  Exercise  Self Created Diets (Portion Control, Healthy Food Choices, etc.)    Food and Nutrition Related History  Wake up: 7:30/8AM  Bed Time: n/a    Food Recall   Breakfast: 8:15AM 1/3 cup granola, blueberries, and yogurt    Snack: skip  Lunch: 12PM leftovers (chicken, rice, broccoli, sauce OR chicken burrito OR stuffed pepper and apple)  Snack: 4PM KIND bar and sometimes 1 cup coffee  Dinner: 6PM pasta OR soup OR 6oz salmon, 2 cups white rice, and 1 cup vegetable   Snack: handful of grapes OR pretzel sticks     [facility]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     Beverages: 48oz water, ICEE drink   Alcohol: none  Volume of beverage intake: 76oz    Weekends: Same  Cravings: carbohydrates  Trouble area of day: evening (snacking)    Frequency of Eating out: 3 times per week  Food restrictions: dairy (lactose intolerant), shellfish   Cooking: wife  Food Shopping: self or wife    Physical Activity Intake  Activity: Walking  Frequency:occasionally  Physical limitations/barriers to exercise: none    Estimated Needs  Energy  SECA: BMR: 2193  [employer] [place] Energy Needs:  BMR : 2032 1-2# loss weekly sedentary: 8524-5153            1-2# loss weekly lightly active: 7096-1275  Maintenance calories for sedentary activity level: 2439  Protein: 94-117g      (1.2-1.5g/kg IBW)  Fluid: 92oz     (35mL/kg IBW)    Nutrition Diagnosis  Yes; Overweight/obesity  related to Excess energy intake as evidenced by  BMI more than normative standard for age and sex (obesity-grade I 32-30. 9)       Nutrition Intervention    Nutrition Prescription  Calories: 1800 calories  Protein: 105g  Fluid: 92oz    Meal Plan (Eddie/Pro/Carb)  Breakfast: 300/400/25/30-45  Snack:  Lunch: 500/30/30-45  Snack: 200/5-10/15-20  Dinner: 500/30/30-45  Snack: 200/5-10/15-20    Nutrition Education:    Healthy Core Manual  Calorie controlled menu  Lean protein food choices  Healthy snack options  Food journaling tips      Nutrition Counseling:  Strategies: meal planning, portion sizes, healthy snack choices, hydration, fiber intake, protein intake, exercise, food journal      Monitoring and Evaluation:  Evaluation criteria:  Energy Intake  Meet protein needs  Maintain adequate hydration  Monitor weekly weight  Meal planning/preparation  Food journal   Decreased portions at mealtimes and snacks  Physical activity     Barriers to learning:none  Readiness to change: Action:  (Changing behavior)  Comprehension: good  Expected Compliance: good

## 2023-09-27 NOTE — PROGRESS NOTES
Behavioral Health Follow Up Note        Name: Jessica Baxter           Mental health and wellness -   Patient presents to the office today for a support. The pt explained that he has lack of motivation and stress which leads to mindless eating. The pt explained he has started to calorie count but its difficult. Discussed reasons why calorie counting was difficult and exploring ideas provided by his dietician. Education in motivation can begin with implementing a plan to start. Reviewed and discussed  Patient educated and handouts provided on emotional eating vs physical hunger and activities to do to stop mindless eating. Adequate hydration-education on setting timers or ted to remind him to hydrate. Exercise-discussed creative ways to increase activity. Example 10 minutes 3 x a day. Meal planning and preparation  Lifestyle changes  Possible problems with poor eating habits  Practice mindful eating.     Setting aside time to eat slowly, and savor food    Goal: 1-set a plan to increase motivation  Next appointment: 10/5/23 medical weight management class

## 2023-09-28 ENCOUNTER — CLINICAL SUPPORT (OUTPATIENT)
Dept: BARIATRICS | Facility: CLINIC | Age: 48
End: 2023-09-28

## 2023-09-28 VITALS — HEIGHT: 71 IN | WEIGHT: 252.4 LBS | BODY MASS INDEX: 35.34 KG/M2

## 2023-09-28 DIAGNOSIS — R63.5 ABNORMAL WEIGHT GAIN: Primary | ICD-10-CM

## 2023-09-28 PROCEDURE — RECHECK

## 2023-09-29 ENCOUNTER — OFFICE VISIT (OUTPATIENT)
Dept: BARIATRICS | Facility: CLINIC | Age: 48
End: 2023-09-29

## 2023-09-29 VITALS — WEIGHT: 250.6 LBS | BODY MASS INDEX: 35.08 KG/M2 | HEIGHT: 71 IN

## 2023-09-29 DIAGNOSIS — R63.5 ABNORMAL WEIGHT GAIN: Primary | ICD-10-CM

## 2023-09-29 PROCEDURE — RECHECK

## 2023-10-05 ENCOUNTER — CLINICAL SUPPORT (OUTPATIENT)
Dept: BARIATRICS | Facility: CLINIC | Age: 48
End: 2023-10-05

## 2023-10-05 VITALS — BODY MASS INDEX: 34.8 KG/M2 | WEIGHT: 248.6 LBS | HEIGHT: 71 IN

## 2023-10-05 DIAGNOSIS — R63.5 ABNORMAL WEIGHT GAIN: Primary | ICD-10-CM

## 2023-10-05 PROCEDURE — RECHECK

## 2023-10-15 DIAGNOSIS — E11.9 TYPE 2 DIABETES MELLITUS WITHOUT COMPLICATION, WITHOUT LONG-TERM CURRENT USE OF INSULIN (HCC): ICD-10-CM

## 2023-10-16 RX ORDER — METFORMIN HYDROCHLORIDE 500 MG/1
TABLET, EXTENDED RELEASE ORAL
Qty: 180 TABLET | Refills: 0 | Status: SHIPPED | OUTPATIENT
Start: 2023-10-16

## 2023-10-17 DIAGNOSIS — E11.9 TYPE 2 DIABETES MELLITUS WITHOUT COMPLICATION, WITHOUT LONG-TERM CURRENT USE OF INSULIN (HCC): ICD-10-CM

## 2023-10-17 RX ORDER — METFORMIN HYDROCHLORIDE 500 MG/1
TABLET, EXTENDED RELEASE ORAL
Qty: 180 TABLET | Refills: 0 | OUTPATIENT
Start: 2023-10-17

## 2023-10-26 ENCOUNTER — CLINICAL SUPPORT (OUTPATIENT)
Dept: BARIATRICS | Facility: CLINIC | Age: 48
End: 2023-10-26

## 2023-10-26 VITALS — WEIGHT: 247.6 LBS | BODY MASS INDEX: 34.66 KG/M2 | HEIGHT: 71 IN

## 2023-10-26 DIAGNOSIS — R63.5 ABNORMAL WEIGHT GAIN: Primary | ICD-10-CM

## 2023-10-26 PROCEDURE — RECHECK

## 2023-11-02 ENCOUNTER — CLINICAL SUPPORT (OUTPATIENT)
Dept: BARIATRICS | Facility: CLINIC | Age: 48
End: 2023-11-02

## 2023-11-02 VITALS — BODY MASS INDEX: 34.07 KG/M2 | HEIGHT: 71 IN | WEIGHT: 243.4 LBS

## 2023-11-02 DIAGNOSIS — R63.5 ABNORMAL WEIGHT GAIN: Primary | ICD-10-CM

## 2023-11-02 PROCEDURE — RECHECK

## 2023-11-16 ENCOUNTER — CLINICAL SUPPORT (OUTPATIENT)
Dept: BARIATRICS | Facility: CLINIC | Age: 48
End: 2023-11-16

## 2023-11-16 VITALS — HEIGHT: 71 IN | WEIGHT: 241.2 LBS | BODY MASS INDEX: 33.77 KG/M2

## 2023-11-16 DIAGNOSIS — R63.5 ABNORMAL WEIGHT GAIN: Primary | ICD-10-CM

## 2023-11-16 PROCEDURE — RECHECK

## 2023-11-21 ENCOUNTER — CLINICAL SUPPORT (OUTPATIENT)
Dept: BARIATRICS | Facility: CLINIC | Age: 48
End: 2023-11-21
Payer: COMMERCIAL

## 2023-11-21 VITALS — HEIGHT: 71 IN | WEIGHT: 239.8 LBS | BODY MASS INDEX: 33.57 KG/M2

## 2023-11-21 DIAGNOSIS — R63.5 ABNORMAL WEIGHT GAIN: Primary | ICD-10-CM

## 2023-11-21 PROCEDURE — RECHECK

## 2023-12-05 NOTE — PROGRESS NOTES
Weight Management Medical Nutrition Assessment  Vera Rinne presented for month 2 f/u with the [facility]. Today's weight is 235.6#. He lost 15# x 10 weeks. Overall loss of 18# (7.1%TBW). Reports he is more mindful of portion sizes. Had one plate at Thanksgiving dinner which is new for him. When dining out/ordering take out he pays attention to fullness cues and brings half of portion home. His wife is planning meals and looking up new recipes. Water intake improving but not yet at goal. Discussed goal of weight maintenance during holiday season. Discussed program options moving forward. Will f/u next month. Great job!     Patient seen by Medical Provider in past 6 months:  yes  Requested to schedule appointment with Medical Provider: No      Anthropometric Measurements  Start Weight (#): 253.6#  Current Weight (#): 235.6#  TBW % Change from start weight: 7.1%  Ideal Body Weight (#): 172#  Goal Weight (#): 220#  Highest: 260#  Lowest:    Weight Loss History  Previous weight loss attempts: Commercial Programs ([employer], Leane Del Castillo, etc.)  Exercise  Self Created Diets (Portion Control, Healthy Food Choices, etc.)    Food and Nutrition Related History  Wake up: 7:30/8AM  Bed Time: n/a    Food Recall   Breakfast: 8:15AM 1/3 cup granola or Kashi cereal, blueberries, and yogurt    Snack: skip  Lunch: 12PM leftovers (chicken, rice, broccoli, sauce OR chicken burrito OR stuffed pepper and apple)  Snack: 4PM KIND bar and sometimes 1 cup coffee  Dinner: 6PM pasta OR soup OR 6oz salmon, 2 cups white rice, and 1 cup vegetable   Snack: handful of grapes OR pretzel sticks     [facility]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     Beverages: 64oz water, ICEE drink   Alcohol: none  Volume of beverage intake: 76oz    Weekends: Same  Cravings: carbohydrates  Trouble area of day: evening (snacking)    Frequency of Eating out: 3 times per week  Food restrictions: dairy (lactose intolerant), shellfish   Cooking: wife  Food Shopping: self or wife    Physical Activity Intake  Activity: Walking  Frequency:occasionally  Physical limitations/barriers to exercise: none    Estimated Needs  Energy  SECA: BMR: 2193  [employer] Energy Needs: BMR : 1951   1-2# loss weekly sedentary: 8604-3321            1-2# loss weekly lightly active: 7749-2264  Maintenance calories for sedentary activity level: 2341  Protein: 94-117g      (1.2-1.5g/kg IBW)  Fluid: 92oz     (35mL/kg IBW)    Nutrition Diagnosis  Yes; Overweight/obesity  related to Excess energy intake as evidenced by  BMI more than normative standard for age and sex (obesity-grade I 32-30. 9)       Nutrition Intervention    Nutrition Prescription  Calories: 1800 calories  Protein: 105g  Fluid: 92oz    Meal Plan (Eddie/Pro/Carb)  Breakfast: 300/400/25/30-45  Snack:  Lunch: 500/30/30-45  Snack: 200/5-10/15-20  Dinner: 500/30/30-45  Snack: 200/5-10/15-20    Nutrition Education:    Healthy Core Manual  Calorie controlled menu  Lean protein food choices  Healthy snack options  Food journaling tips      Nutrition Counseling:  Strategies: meal planning, portion sizes, healthy snack choices, hydration, fiber intake, protein intake, exercise, food journal      Monitoring and Evaluation:  Evaluation criteria:  Energy Intake  Meet protein needs  Maintain adequate hydration  Monitor weekly weight  Meal planning/preparation  Food journal   Decreased portions at mealtimes and snacks  Physical activity     Barriers to learning:none  Readiness to change: Action:  (Changing behavior)  Comprehension: good  Expected Compliance: good

## 2023-12-12 ENCOUNTER — OFFICE VISIT (OUTPATIENT)
Dept: BARIATRICS | Facility: CLINIC | Age: 48
End: 2023-12-12

## 2023-12-12 VITALS — WEIGHT: 235.6 LBS | HEIGHT: 71 IN | BODY MASS INDEX: 32.98 KG/M2

## 2023-12-12 DIAGNOSIS — R63.5 ABNORMAL WEIGHT GAIN: Primary | ICD-10-CM

## 2023-12-12 PROCEDURE — RECHECK

## 2023-12-14 ENCOUNTER — CLINICAL SUPPORT (OUTPATIENT)
Dept: BARIATRICS | Facility: CLINIC | Age: 48
End: 2023-12-14

## 2023-12-14 VITALS — BODY MASS INDEX: 33.12 KG/M2 | HEIGHT: 71 IN | WEIGHT: 236.6 LBS

## 2023-12-14 DIAGNOSIS — R63.5 ABNORMAL WEIGHT GAIN: Primary | ICD-10-CM

## 2023-12-14 PROCEDURE — RECHECK

## 2023-12-27 NOTE — PROGRESS NOTES
Weight Management Medical Nutrition Assessment  Kiel presented for month 3 f/u with the [facility]. Today's weight is 235.6#. He maintained x 3 weeks. Overall loss of 18# (7.1%TBW). Reports he is satisfied with weight loss results. Reviewed program options. Completed a body composition using SECA scale and reviewed results with patient. Reevue indirect calorimter revealed REE is normal (-1%)compared to the predictive normal for someone his same age, height, and gender.   Reevue Indirect Calorimeter REE: 2074           Weight loss without exercise: 0823-7782          Weight loss with exercise:  2165-6074                    Maintenance:    0264-1147          Patient seen by Medical Provider in past 6 months:  yes  Requested to schedule appointment with Medical Provider: No      Anthropometric Measurements  Start Weight (#): 253.6#  Current Weight (#): 235.6#  TBW % Change from start weight: 7.1%  Ideal Body Weight (#): 172#  Goal Weight (#): 220#  Highest: 260#  Lowest:    Weight Loss History  Previous weight loss attempts: Commercial Programs ([employer], [employer], etc.)  Exercise  Self Created Diets (Portion Control, Healthy Food Choices, etc.)    Food and Nutrition Related History  Wake up: 7:30/8AM  Bed Time: n/a    Food Recall   Breakfast: 8:15AM 1/3 cup granola or Kashi cereal, blueberries, and yogurt    Snack: skip  Lunch: 12PM leftovers (chicken, rice, broccoli, sauce OR chicken burrito OR stuffed pepper and apple)  Snack: 4PM KIND bar and sometimes 1 cup coffee  Dinner: 6PM pasta OR soup OR 6oz salmon, 2 cups white rice, and 1 cup vegetable   Snack: handful of grapes OR pretzel sticks     [facility]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     Beverages: 64oz water, ICEE drink   Alcohol: none  Volume of beverage intake: 76oz    Weekends: Same  Cravings: carbohydrates  Trouble area of day: evening (snacking)    Frequency of Eating out: 3 times per week  Food restrictions: dairy (lactose intolerant), shellfish    Cooking: wife  Food Shopping: self or wife    Physical Activity Intake  Activity: Walking  Frequency:occasionally  Physical limitations/barriers to exercise: none    Estimated Needs  Energy  SECA: BMR:  2,099  [employer] Needs: BMR : 1951   1-2# loss weekly sedentary: 3151-5881            1-2# loss weekly lightly active: 5107-8623  Maintenance calories for sedentary activity level: 2341  Protein: 94-117g      (1.2-1.5g/kg IBW)  Fluid: 92oz     (35mL/kg IBW)    Nutrition Diagnosis  Yes;    Overweight/obesity  related to Excess energy intake as evidenced by  BMI more than normative standard for age and sex (obesity-grade I 30-34.9)       Nutrition Intervention    Nutrition Prescription  Calories: 1800 calories  Protein: 105g  Fluid: 92oz    Meal Plan (Eddie/Pro/Carb)  Breakfast: 300/400/25/30-45  Snack:  Lunch: 500/30/30-45  Snack: 200/5-10/15-20  Dinner: 500/30/30-45  Snack: 200/5-10/15-20    Nutrition Education:    Healthy Core Manual  Calorie controlled menu  Lean protein food choices  Healthy snack options  Food journaling tips      Nutrition Counseling:  Strategies: meal planning, portion sizes, healthy snack choices, hydration, fiber intake, protein intake, exercise, food journal      Monitoring and Evaluation:  Evaluation criteria:  Energy Intake  Meet protein needs  Maintain adequate hydration  Monitor weekly weight  Meal planning/preparation  Food journal   Decreased portions at mealtimes and snacks  Physical activity     Barriers to learning:none  Readiness to change: Action:  (Changing behavior)  Comprehension: good  Expected Compliance: good

## 2024-01-01 DIAGNOSIS — E11.9 TYPE 2 DIABETES MELLITUS WITHOUT COMPLICATION, WITHOUT LONG-TERM CURRENT USE OF INSULIN (HCC): ICD-10-CM

## 2024-01-01 DIAGNOSIS — E78.00 HYPERCHOLESTEROLEMIA: ICD-10-CM

## 2024-01-03 RX ORDER — PRAVASTATIN SODIUM 10 MG
10 TABLET ORAL
Qty: 90 TABLET | Refills: 0 | Status: SHIPPED | OUTPATIENT
Start: 2024-01-03

## 2024-01-03 RX ORDER — METFORMIN HYDROCHLORIDE 500 MG/1
TABLET, EXTENDED RELEASE ORAL
Qty: 180 TABLET | Refills: 0 | Status: SHIPPED | OUTPATIENT
Start: 2024-01-03

## 2024-01-04 ENCOUNTER — CLINICAL SUPPORT (OUTPATIENT)
Dept: BARIATRICS | Facility: CLINIC | Age: 49
End: 2024-01-04

## 2024-01-04 VITALS — BODY MASS INDEX: 33.07 KG/M2 | WEIGHT: 236.2 LBS | HEIGHT: 71 IN

## 2024-01-04 DIAGNOSIS — R63.5 ABNORMAL WEIGHT GAIN: Primary | ICD-10-CM

## 2024-01-04 PROCEDURE — RECHECK

## 2024-01-05 ENCOUNTER — OFFICE VISIT (OUTPATIENT)
Dept: BARIATRICS | Facility: CLINIC | Age: 49
End: 2024-01-05

## 2024-01-05 VITALS — BODY MASS INDEX: 32.98 KG/M2 | HEIGHT: 71 IN | WEIGHT: 235.6 LBS

## 2024-01-05 DIAGNOSIS — R63.5 ABNORMAL WEIGHT GAIN: Primary | ICD-10-CM

## 2024-01-05 PROCEDURE — RECHECK

## 2024-02-01 ENCOUNTER — CLINICAL SUPPORT (OUTPATIENT)
Dept: BARIATRICS | Facility: CLINIC | Age: 49
End: 2024-02-01

## 2024-02-01 VITALS — BODY MASS INDEX: 33.1 KG/M2 | HEIGHT: 71 IN | WEIGHT: 236.4 LBS

## 2024-02-01 DIAGNOSIS — R63.5 ABNORMAL WEIGHT GAIN: Primary | ICD-10-CM

## 2024-02-01 PROCEDURE — RECHECK

## 2024-02-02 LAB
LEFT EYE DIABETIC RETINOPATHY: NORMAL
RIGHT EYE DIABETIC RETINOPATHY: NORMAL

## 2024-02-08 ENCOUNTER — CLINICAL SUPPORT (OUTPATIENT)
Dept: BARIATRICS | Facility: CLINIC | Age: 49
End: 2024-02-08

## 2024-02-08 VITALS — BODY MASS INDEX: 33.58 KG/M2 | HEIGHT: 70 IN | WEIGHT: 234.6 LBS

## 2024-02-08 DIAGNOSIS — R63.5 ABNORMAL WEIGHT GAIN: Primary | ICD-10-CM

## 2024-02-08 PROCEDURE — RECHECK

## 2024-03-08 ENCOUNTER — OFFICE VISIT (OUTPATIENT)
Dept: FAMILY MEDICINE CLINIC | Facility: HOSPITAL | Age: 49
End: 2024-03-08
Payer: COMMERCIAL

## 2024-03-08 VITALS
BODY MASS INDEX: 33.63 KG/M2 | SYSTOLIC BLOOD PRESSURE: 112 MMHG | OXYGEN SATURATION: 99 % | HEART RATE: 76 BPM | DIASTOLIC BLOOD PRESSURE: 68 MMHG | HEIGHT: 71 IN | TEMPERATURE: 97.6 F | WEIGHT: 240.2 LBS

## 2024-03-08 DIAGNOSIS — M10.9 GOUT, UNSPECIFIED CAUSE, UNSPECIFIED CHRONICITY, UNSPECIFIED SITE: ICD-10-CM

## 2024-03-08 DIAGNOSIS — E66.9 OBESITY, CLASS II, BMI 35-39.9: ICD-10-CM

## 2024-03-08 DIAGNOSIS — E11.9 TYPE 2 DIABETES MELLITUS WITHOUT COMPLICATION, WITHOUT LONG-TERM CURRENT USE OF INSULIN (HCC): Primary | ICD-10-CM

## 2024-03-08 DIAGNOSIS — E78.00 HYPERCHOLESTEROLEMIA: ICD-10-CM

## 2024-03-08 PROCEDURE — 99214 OFFICE O/P EST MOD 30 MIN: CPT | Performed by: FAMILY MEDICINE

## 2024-03-08 NOTE — PROGRESS NOTES
Name: Kiel Chavira      : 1975      MRN: [id number]  Encounter Provider: Casa Hogue MD  Encounter Date: 3/8/2024   Encounter department: [facility] CARE [address]     Assessment & Plan     1. Type 2 diabetes mellitus without complication, without long-term current use of insulin (HCC)  -     Albumin / creatinine urine ratio; Future; Expected date: 2024  -     Comprehensive metabolic panel; Future; Expected date: 2024  -     Hemoglobin A1C; Future; Expected date: 2024  -     Lipid Panel with Direct LDL reflex; Future; Expected date: 2024  -     TSH, 3rd generation with Free T4 reflex; Future; Expected date: 2024  Will update labs.   Has gone to the weight loss center.   Has lost about 20 pounds.   Continue with metformin.     2. Hypercholesterolemia  Stable. On pravastatin.   3. Obesity, Class II, BMI 35-39.9  Continue efforts with deitary conrol   4. Gout, unspecified cause, unspecified chronicity, unspecified site  -     Uric acid; Future  Check uric acid.   No exacerbations.   Work on weight loss.          Subjective      Kiel is here for dm, htn, gout fu .   Overall doing well.   He has been going to the weight loss center and doing well.   Reprots about 20 pound weight loss.   No new concerns.       Review of Systems   Constitutional: Negative.  Negative for activity change, appetite change, chills and diaphoresis.   HENT:  Negative for congestion and dental problem.    Respiratory: Negative.  Negative for apnea, chest tightness, shortness of breath and wheezing.    Cardiovascular: Negative.  Negative for chest pain, palpitations and leg swelling.   Gastrointestinal: Negative.  Negative for abdominal distention, abdominal pain, constipation, diarrhea and nausea.   Genitourinary: Negative.  Negative for difficulty urinating, dysuria and frequency.       Current Outpatient Medications on File Prior to Visit   Medication Sig   • allopurinol  "(ZYLOPRIM) 100 mg tablet Take 2 tablets (200 mg total) by mouth daily   • ASPIRIN 81 PO    • Icosapent Ethyl (Vascepa) 1 g CAPS Take 2 capsules (2 g total) by mouth 2 (two) times a day   • metFORMIN (GLUCOPHAGE-XR) 500 mg 24 hr tablet TAKE TWO TABLETS BY MOUTH DAILY WITH DINNER   • Multiple Vitamins-Minerals (MULTIVITAMIN MEN PO)    • pravastatin (PRAVACHOL) 10 mg tablet TAKE ONE TABLET BY MOUTH AT BEDTIME   • Fluticasone Propionate (FLONASE NA) into each nostril (Patient not taking: Reported on 8/30/2023)   • semaglutide, 0.25 or 0.5 mg/dose, (Ozempic, 0.25 or 0.5 MG/DOSE,) 2 mg/3 mL injection pen Take 0.25 mg subcutaneously once a week for 4 weeks then increase to 0.5 mg subcutaneously once a week. (Patient not taking: Reported on 8/30/2023)       Objective     /68   Pulse 76   Temp 97.6 °F (36.4 °C)   Ht 5' 11\" (1.803 m)   Wt 109 kg (240 lb 3.2 oz)   SpO2 99%   BMI 33.50 kg/m²     Physical Exam  Constitutional:       General: He is not in acute distress.     Appearance: Normal appearance. He is well-developed.   HENT:      Head: Normocephalic and atraumatic.      Right Ear: External ear normal.      Left Ear: External ear normal.      Nose: Nose normal.      Mouth/Throat:      Mouth: Mucous membranes are moist.   Eyes:      Conjunctiva/sclera: Conjunctivae normal.      Pupils: Pupils are equal, round, and reactive to light.   Cardiovascular:      Rate and Rhythm: Normal rate and regular rhythm.      Heart sounds: Normal heart sounds. No murmur heard.     No friction rub. No gallop.   Pulmonary:      Effort: Pulmonary effort is normal. No respiratory distress.      Breath sounds: Normal breath sounds. No wheezing or rales.   Chest:      Chest wall: No tenderness.   Abdominal:      General: Bowel sounds are normal. There is no distension.      Palpations: Abdomen is soft. There is no mass.      Tenderness: There is no abdominal tenderness. There is no guarding or rebound.   Musculoskeletal:         " General: Normal range of motion.      Cervical back: Normal range of motion and neck supple.   Skin:     General: Skin is warm.   Neurological:      Mental Status: He is alert and oriented to person, place, and time.   Psychiatric:         Mood and Affect: Mood normal.         Behavior: Behavior normal.         Thought Content: Thought content normal.         Judgment: Judgment normal.       Casa Hogue MD

## 2024-03-22 LAB

## 2024-05-29 ENCOUNTER — TELEPHONE (OUTPATIENT)
Dept: FAMILY MEDICINE CLINIC | Facility: HOSPITAL | Age: 49
End: 2024-05-29

## 2024-06-30 DIAGNOSIS — E78.00 HYPERCHOLESTEROLEMIA: ICD-10-CM

## 2024-06-30 DIAGNOSIS — M10.9 GOUT, UNSPECIFIED CAUSE, UNSPECIFIED CHRONICITY, UNSPECIFIED SITE: ICD-10-CM

## 2024-06-30 DIAGNOSIS — E11.9 TYPE 2 DIABETES MELLITUS WITHOUT COMPLICATION, WITHOUT LONG-TERM CURRENT USE OF INSULIN (HCC): ICD-10-CM

## 2024-07-01 RX ORDER — ALLOPURINOL 100 MG/1
200 TABLET ORAL DAILY
Qty: 180 TABLET | Refills: 0 | Status: SHIPPED | OUTPATIENT
Start: 2024-07-01

## 2024-07-01 RX ORDER — METFORMIN HYDROCHLORIDE 500 MG/1
TABLET, EXTENDED RELEASE ORAL
Qty: 180 TABLET | Refills: 0 | Status: SHIPPED | OUTPATIENT
Start: 2024-07-01

## 2024-07-01 RX ORDER — PRAVASTATIN SODIUM 10 MG
10 TABLET ORAL
Qty: 90 TABLET | Refills: 0 | Status: SHIPPED | OUTPATIENT
Start: 2024-07-01

## 2024-08-24 ENCOUNTER — APPOINTMENT (OUTPATIENT)
Dept: LAB | Facility: CLINIC | Age: 49
End: 2024-08-24
Payer: COMMERCIAL

## 2024-08-24 ENCOUNTER — TRANSCRIBE ORDERS (OUTPATIENT)
Dept: LAB | Facility: CLINIC | Age: 49
End: 2024-08-24

## 2024-08-24 DIAGNOSIS — M10.9 GOUT, UNSPECIFIED CAUSE, UNSPECIFIED CHRONICITY, UNSPECIFIED SITE: ICD-10-CM

## 2024-08-24 DIAGNOSIS — E11.9 TYPE 2 DIABETES MELLITUS WITHOUT COMPLICATION, WITHOUT LONG-TERM CURRENT USE OF INSULIN (HCC): ICD-10-CM

## 2024-08-24 DIAGNOSIS — Z00.8 HEALTH EXAMINATION IN POPULATION SURVEYS: ICD-10-CM

## 2024-08-24 DIAGNOSIS — Z00.8 HEALTH EXAMINATION IN POPULATION SURVEYS: Primary | ICD-10-CM

## 2024-08-24 LAB
ALBUMIN SERPL BCG-MCNC: 4.3 G/DL (ref 3.5–5)
ALP SERPL-CCNC: 41 U/L (ref 34–104)
ALT SERPL W P-5'-P-CCNC: 29 U/L (ref 7–52)
ANION GAP SERPL CALCULATED.3IONS-SCNC: 10 MMOL/L (ref 4–13)
AST SERPL W P-5'-P-CCNC: 20 U/L (ref 13–39)
BILIRUB SERPL-MCNC: 0.65 MG/DL (ref 0.2–1)
BUN SERPL-MCNC: 13 MG/DL (ref 5–25)
CALCIUM SERPL-MCNC: 9.4 MG/DL (ref 8.4–10.2)
CHLORIDE SERPL-SCNC: 103 MMOL/L (ref 96–108)
CHOLEST SERPL-MCNC: 185 MG/DL
CO2 SERPL-SCNC: 29 MMOL/L (ref 21–32)
CORTIS AM PEAK SERPL-MCNC: 14.5 UG/DL (ref 6.7–22.6)
CREAT SERPL-MCNC: 1.16 MG/DL (ref 0.6–1.3)
CREAT UR-MCNC: 110 MG/DL
EST. AVERAGE GLUCOSE BLD GHB EST-MCNC: 131 MG/DL
GFR SERPL CREATININE-BSD FRML MDRD: 72 ML/MIN/1.73SQ M
GLUCOSE P FAST SERPL-MCNC: 127 MG/DL (ref 65–99)
HBA1C MFR BLD: 6.2 %
HDLC SERPL-MCNC: 37 MG/DL
LDLC SERPL CALC-MCNC: 81 MG/DL (ref 0–100)
MICROALBUMIN UR-MCNC: <7 MG/L
POTASSIUM SERPL-SCNC: 4.4 MMOL/L (ref 3.5–5.3)
PROT SERPL-MCNC: 7.2 G/DL (ref 6.4–8.4)
SODIUM SERPL-SCNC: 142 MMOL/L (ref 135–147)
TRIGL SERPL-MCNC: 336 MG/DL
TSH SERPL DL<=0.05 MIU/L-ACNC: 2.54 UIU/ML (ref 0.45–4.5)
URATE SERPL-MCNC: 6.9 MG/DL (ref 3.5–8.5)

## 2024-08-24 PROCEDURE — 80053 COMPREHEN METABOLIC PANEL: CPT

## 2024-08-24 PROCEDURE — 83036 HEMOGLOBIN GLYCOSYLATED A1C: CPT

## 2024-08-24 PROCEDURE — 84550 ASSAY OF BLOOD/URIC ACID: CPT

## 2024-08-24 PROCEDURE — 84443 ASSAY THYROID STIM HORMONE: CPT

## 2024-08-24 PROCEDURE — 80061 LIPID PANEL: CPT

## 2024-08-24 PROCEDURE — 82043 UR ALBUMIN QUANTITATIVE: CPT

## 2024-08-24 PROCEDURE — 82570 ASSAY OF URINE CREATININE: CPT

## 2024-08-26 ENCOUNTER — TELEPHONE (OUTPATIENT)
Dept: UROLOGY | Facility: CLINIC | Age: 49
End: 2024-08-26

## 2024-08-26 NOTE — TELEPHONE ENCOUNTER
----- Message from MACRINA Monroe sent at 8/26/2024  1:31 PM EDT -----  Lab work was ordered over a year ago.  Patient was supposed to have suppression cortisol level which does not appear that they had.  Has adrenal nodule and should be scheduled for follow-up

## 2024-08-26 NOTE — TELEPHONE ENCOUNTER
Called patient and left a generic vm, due to no communication consent on file. Informed the pt to call the office back at [phone].     If patient calls back please relay Cami's message as well as scheduling an overdue f/u per Cami. Will try calling again later.

## 2024-08-30 NOTE — TELEPHONE ENCOUNTER
Called patient x2 and left a generic vm, due to no communication consent on file. Informed the pt to call the office back at [phone].

## 2024-09-13 ENCOUNTER — OFFICE VISIT (OUTPATIENT)
Dept: FAMILY MEDICINE CLINIC | Facility: HOSPITAL | Age: 49
End: 2024-09-13
Payer: COMMERCIAL

## 2024-09-13 VITALS
TEMPERATURE: 97.3 F | SYSTOLIC BLOOD PRESSURE: 126 MMHG | OXYGEN SATURATION: 95 % | BODY MASS INDEX: 35.31 KG/M2 | DIASTOLIC BLOOD PRESSURE: 85 MMHG | HEART RATE: 83 BPM | WEIGHT: 252.2 LBS | HEIGHT: 71 IN

## 2024-09-13 DIAGNOSIS — Z13.6 SCREENING FOR CARDIOVASCULAR CONDITION: ICD-10-CM

## 2024-09-13 DIAGNOSIS — E11.9 TYPE 2 DIABETES MELLITUS WITHOUT COMPLICATION, WITHOUT LONG-TERM CURRENT USE OF INSULIN (HCC): Primary | ICD-10-CM

## 2024-09-13 DIAGNOSIS — E78.00 HYPERCHOLESTEROLEMIA: ICD-10-CM

## 2024-09-13 DIAGNOSIS — G47.33 OSA (OBSTRUCTIVE SLEEP APNEA): ICD-10-CM

## 2024-09-13 PROCEDURE — 99214 OFFICE O/P EST MOD 30 MIN: CPT | Performed by: FAMILY MEDICINE

## 2024-09-13 RX ORDER — ATORVASTATIN CALCIUM 10 MG/1
10 TABLET, FILM COATED ORAL DAILY
Qty: 90 TABLET | Refills: 1 | Status: SHIPPED | OUTPATIENT
Start: 2024-09-13

## 2024-09-13 NOTE — ASSESSMENT & PLAN NOTE
Overall diabetes has been well-controlled.  Will continue with metformin.  Initially on Ozempic due to weight loss he has been off of this and really never took this.     Continue working on dietary control and exercise.  Discussed scheduling exercise.  Discussed cardiovascular exercise and consider strength training as well.    Follow-up with blood work in 6 months.  Lab Results   Component Value Date    HGBA1C 6.2 (H) 08/24/2024       Orders:    Albumin / creatinine urine ratio; Future    Comprehensive metabolic panel; Future    Hemoglobin A1C; Future    CBC and Platelet; Future    Lipid Panel with Direct LDL reflex; Future

## 2024-09-13 NOTE — ASSESSMENT & PLAN NOTE
With pravastatin triglycerides have been high.    Would like to retry atorvastatin.  Potentially had a lot of muscle aches with atorvastatin but this was not true clear.  Has not had any issues with pravastatin.  Agreed to retry atorvastatin and monitor symptoms.    Repeat blood work in 6 months.  Orders:    atorvastatin (LIPITOR) 10 mg tablet; Take 1 tablet (10 mg total) by mouth daily

## 2024-09-13 NOTE — PROGRESS NOTES
Ambulatory Visit  Name: Kiel Chavira      : 1975      MRN: [id number]  Encounter Provider: Casa Hogue MD  Encounter Date: 2024   Encounter department: [facility] PRIMARY CARE [address]     Assessment & Plan  Type 2 diabetes mellitus without complication, without long-term current use of insulin (HCC)  Overall diabetes has been well-controlled.  Will continue with metformin.  Initially on Ozempic due to weight loss he has been off of this and really never took this.     Continue working on dietary control and exercise.  Discussed scheduling exercise.  Discussed cardiovascular exercise and consider strength training as well.    Follow-up with blood work in 6 months.  Lab Results   Component Value Date    HGBA1C 6.2 (H) 2024       Orders:    Albumin / creatinine urine ratio; Future    Comprehensive metabolic panel; Future    Hemoglobin A1C; Future    CBC and Platelet; Future    Lipid Panel with Direct LDL reflex; Future    Hypercholesterolemia  With pravastatin triglycerides have been high.    Would like to retry atorvastatin.  Potentially had a lot of muscle aches with atorvastatin but this was not true clear.  Has not had any issues with pravastatin.  Agreed to retry atorvastatin and monitor symptoms.    Repeat blood work in 6 months.  Orders:    atorvastatin (LIPITOR) 10 mg tablet; Take 1 tablet (10 mg total) by mouth daily    Screening for cardiovascular condition  Discussed pros and cons of CT coronary calcium score.  Will go ahead and obtain this.  Orders:    CT coronary calcium score; Future    RADHA (obstructive sleep apnea)  Has been doing well.  Continues with the CPAP machine.  Has been compliant with this.          History of Present Illness     Kiel is here for follow-up.  Known diabetes, hyperlipidemia, obstructive sleep apnea.  Last labs showing elevated triglycerides.  Asking about going back on atorvastatin as it was more effective than pravastatin.  On review  "it seems like he was having muscle aches with atorvastatin but this was not very clear.  Overall doing well otherwise.  Feeling well.  He has been very busy and has not gotten back to exercising regularly.  He is working on his dietary control.          Review of Systems   Constitutional: Negative.  Negative for activity change, appetite change, chills and diaphoresis.   HENT:  Negative for congestion and dental problem.    Respiratory: Negative.  Negative for apnea, chest tightness, shortness of breath and wheezing.    Cardiovascular: Negative.  Negative for chest pain, palpitations and leg swelling.   Gastrointestinal: Negative.  Negative for abdominal distention, abdominal pain, constipation, diarrhea and nausea.   Genitourinary: Negative.  Negative for difficulty urinating, dysuria and frequency.           Objective     /85 (BP Location: Left arm, Patient Position: Sitting, Cuff Size: Large)   Pulse 83   Temp (!) 97.3 °F (36.3 °C) (Tympanic)   Ht 5' 11\" (1.803 m)   Wt 114 kg (252 lb 3.2 oz)   SpO2 95%   BMI 35.17 kg/m²     Physical Exam  Vitals and nursing note reviewed.   Constitutional:       General: He is not in acute distress.     Appearance: He is well-developed. He is not ill-appearing.   HENT:      Head: Normocephalic and atraumatic.      Right Ear: External ear normal.      Left Ear: External ear normal.      Mouth/Throat:      Mouth: Mucous membranes are moist.      Pharynx: Oropharynx is clear.   Eyes:      Extraocular Movements: Extraocular movements intact.      Conjunctiva/sclera: Conjunctivae normal.      Pupils: Pupils are equal, round, and reactive to light.   Cardiovascular:      Rate and Rhythm: Normal rate and regular rhythm.      Heart sounds: Normal heart sounds. No murmur heard.  Pulmonary:      Effort: Pulmonary effort is normal.      Breath sounds: Normal breath sounds.   Abdominal:      General: Bowel sounds are normal. There is no distension.      Palpations: Abdomen is " soft. There is no mass.      Tenderness: There is no abdominal tenderness. There is no guarding.      Hernia: No hernia is present.   Musculoskeletal:         General: Normal range of motion.      Cervical back: Normal range of motion and neck supple.   Skin:     General: Skin is warm and dry.      Capillary Refill: Capillary refill takes less than 2 seconds.   Neurological:      General: No focal deficit present.      Mental Status: He is alert and oriented to person, place, and time.   Psychiatric:         Mood and Affect: Mood normal.         Behavior: Behavior normal.

## 2024-09-15 DIAGNOSIS — E11.9 TYPE 2 DIABETES MELLITUS WITHOUT COMPLICATION, WITHOUT LONG-TERM CURRENT USE OF INSULIN (HCC): ICD-10-CM

## 2024-09-15 DIAGNOSIS — E78.00 HYPERCHOLESTEROLEMIA: ICD-10-CM

## 2024-09-17 RX ORDER — METFORMIN HCL 500 MG
TABLET, EXTENDED RELEASE 24 HR ORAL
Qty: 180 TABLET | Refills: 1 | Status: SHIPPED | OUTPATIENT
Start: 2024-09-17

## 2024-09-19 RX ORDER — ICOSAPENT ETHYL 1000 MG/1
CAPSULE ORAL
Qty: 180 CAPSULE | Refills: 0 | Status: SHIPPED | OUTPATIENT
Start: 2024-09-19

## 2024-09-27 ENCOUNTER — HOSPITAL ENCOUNTER (OUTPATIENT)
Dept: RADIOLOGY | Facility: HOSPITAL | Age: 49
Discharge: HOME | End: 2024-09-27
Payer: COMMERCIAL

## 2024-09-27 DIAGNOSIS — Z13.6 SCREENING FOR CARDIOVASCULAR CONDITION: ICD-10-CM

## 2024-09-27 PROCEDURE — 75571 CT HRT W/O DYE W/CA TEST: CPT

## 2024-11-26 ENCOUNTER — OFFICE VISIT (OUTPATIENT)
Dept: URGENT CARE | Facility: CLINIC | Age: 49
End: 2024-11-26
Payer: COMMERCIAL

## 2024-11-26 VITALS
SYSTOLIC BLOOD PRESSURE: 128 MMHG | DIASTOLIC BLOOD PRESSURE: 82 MMHG | TEMPERATURE: 98.1 F | BODY MASS INDEX: 35.45 KG/M2 | RESPIRATION RATE: 18 BRPM | HEART RATE: 79 BPM | WEIGHT: 254.2 LBS | OXYGEN SATURATION: 97 %

## 2024-11-26 DIAGNOSIS — J20.9 ACUTE BRONCHITIS, UNSPECIFIED ORGANISM: Primary | ICD-10-CM

## 2024-11-26 PROCEDURE — 99213 OFFICE O/P EST LOW 20 MIN: CPT

## 2024-11-26 RX ORDER — BENZONATATE 200 MG/1
200 CAPSULE ORAL 3 TIMES DAILY PRN
Qty: 20 CAPSULE | Refills: 0 | Status: SHIPPED | OUTPATIENT
Start: 2024-11-26

## 2024-11-26 RX ORDER — AZITHROMYCIN 250 MG/1
TABLET, FILM COATED ORAL
Qty: 6 TABLET | Refills: 0 | Status: SHIPPED | OUTPATIENT
Start: 2024-11-26 | End: 2024-11-30

## 2024-11-26 NOTE — PATIENT INSTRUCTIONS
Nasal spray as often as needed in each nostril  Flonase nasal spray 1 spray to each nostril daily  Mucinex in the blue box for congestion  Take the antibiotic as prescribed  Increase your fluids and rest  Take the Tessalon Perles as needed for cough

## 2024-11-26 NOTE — PROGRESS NOTES
[facility]        NAME: Kiel Chavira is a 49 y.o. male  : 1975    MRN: [id number]  DATE: 2024  TIME: 4:02 PM    Assessment and Plan   Acute bronchitis, unspecified organism [J20.9]  1. Acute bronchitis, unspecified organism  azithromycin (ZITHROMAX) 250 mg tablet    benzonatate (TESSALON) 200 MG capsule            Patient Instructions   Nasal spray as often as needed in each nostril  Flonase nasal spray 1 spray to each nostril daily  Mucinex in the blue box for congestion  Take the antibiotic as prescribed  Increase your fluids and rest  Take the Tessalon Perles as needed for cough    Follow up with PCP in 3-5 days.  Proceed to  ER if symptoms worsen.    If tests have been performed at [facility], our office will contact you with results if changes need to be made to the care plan discussed with you at the visit.  You can review your full results on [facility].    Chief Complaint     Chief Complaint   Patient presents with    Cough     Started 10 days ago with nasal congestion, sore throat and productive cough that is worse at night, denies fever, tried dayquil with some relief         History of Present Illness       Is a 49-year-old male who presents today with cough sore throat.  He states he was out of town approximately 10 days ago and started getting sick with sore throat nasal congestion.  He states that he does have postnasal drip cough is worse at nighttime and he has a lot of mucus in the morning.  He denies any fever or chills no shortness of breath or chest pain.  He did not do any COVID testing when he came home.    Cough  Associated symptoms include postnasal drip and a sore throat. Pertinent negatives include no shortness of breath.       Review of Systems   Review of Systems   Constitutional: Negative.    HENT:  Positive for congestion, postnasal drip and sore throat.    Respiratory:  Positive for cough. Negative for shortness of breath and stridor.     Cardiovascular: Negative.    Gastrointestinal: Negative.    Genitourinary: Negative.    Neurological: Negative.          Current Medications       Current Outpatient Medications:     azithromycin (ZITHROMAX) 250 mg tablet, Take 2 tablets today then 1 tablet daily x 4 days, Disp: 6 tablet, Rfl: 0    benzonatate (TESSALON) 200 MG capsule, Take 1 capsule (200 mg total) by mouth 3 (three) times a day as needed for cough, Disp: 20 capsule, Rfl: 0    allopurinol (ZYLOPRIM) 100 mg tablet, TAKE TWO TABLETS BY MOUTH DAILY, Disp: 180 tablet, Rfl: 0    ASPIRIN 81 PO, , Disp: , Rfl:     atorvastatin (LIPITOR) 10 mg tablet, Take 1 tablet (10 mg total) by mouth daily, Disp: 90 tablet, Rfl: 1    Fluticasone Propionate (FLONASE NA), into each nostril (Patient not taking: Reported on 8/30/2023), Disp: , Rfl:     metFORMIN (GLUCOPHAGE-XR) 500 mg 24 hr tablet, TAKE TWO TABLETS BY MOUTH DAILY WITH DINNER, Disp: 180 tablet, Rfl: 1    Multiple Vitamins-Minerals (MULTIVITAMIN MEN PO), , Disp: , Rfl:     semaglutide, 0.25 or 0.5 mg/dose, (Ozempic, 0.25 or 0.5 MG/DOSE,) 2 mg/3 mL injection pen, Take 0.25 mg subcutaneously once a week for 4 weeks then increase to 0.5 mg subcutaneously once a week. (Patient not taking: Reported on 8/30/2023), Disp: 3 mL, Rfl: 3    Vascepa 1 g CAPS, Take 2 capsules (2 g total) by mouth 2 (two) times a day, Disp: 180 capsule, Rfl: 0    Current Allergies     Allergies as of 11/26/2024    (No Known Allergies)            The following portions of the patient's history were reviewed and updated as appropriate: allergies, current medications, past family history, past medical history, past social history, past surgical history and problem list.     Past Medical History:   Diagnosis Date    Allergic     Diabetes mellitus (HCC) March 2022    Gout     Hyperlipidemia     Kidney stone     Palpitations     Sleep apnea        Past Surgical History:   Procedure Laterality Date    URETERAL STENT PLACEMENT  2003    [place]  TOOTH EXTRACTION  1980       Family History   Problem Relation Age of Onset    Hypertension Mother     Hyperlipidemia Mother     Hypertension Father     Hyperlipidemia Father     Depression Sister     Stroke Maternal Uncle     Diabetes Paternal Uncle     Cancer Paternal Grandmother         pancreatic cancer    Heart disease Neg Hx     Thyroid disease Neg Hx          Medications have been verified.        Objective   /82 (BP Location: Right arm, Patient Position: Sitting)   Pulse 79   Temp 98.1 °F (36.7 °C) (Tympanic)   Resp 18   Wt 115 kg (254 lb 3.2 oz)   SpO2 97%   BMI 35.45 kg/m²   No LMP for male patient.       Physical Exam     Physical Exam  Constitutional:       Appearance: Normal appearance.   HENT:      Head: Normocephalic and atraumatic.      Right Ear: Tympanic membrane, ear canal and external ear normal.      Left Ear: Tympanic membrane, ear canal and external ear normal.      Nose: Nose normal. No congestion.      Mouth/Throat:      Pharynx: Posterior oropharyngeal erythema present.   Eyes:      Conjunctiva/sclera: Conjunctivae normal.      Pupils: Pupils are equal, round, and reactive to light.   Cardiovascular:      Rate and Rhythm: Normal rate and regular rhythm.      Pulses: Normal pulses.      Heart sounds: Normal heart sounds.   Pulmonary:      Effort: Pulmonary effort is normal.      Breath sounds: Normal breath sounds.   Abdominal:      General: Abdomen is flat. Bowel sounds are normal.   Musculoskeletal:         General: Normal range of motion.   Lymphadenopathy:      Cervical: No cervical adenopathy.   Skin:     General: Skin is warm.      Capillary Refill: Capillary refill takes less than 2 seconds.   Neurological:      General: No focal deficit present.      Mental Status: He is alert and oriented to person, place, and time.   Psychiatric:         Mood and Affect: Mood normal.         Thought Content: Thought content normal.         Judgment: Judgment normal.

## 2024-12-16 DIAGNOSIS — M10.9 GOUT, UNSPECIFIED CAUSE, UNSPECIFIED CHRONICITY, UNSPECIFIED SITE: ICD-10-CM

## 2024-12-17 RX ORDER — ALLOPURINOL 100 MG/1
200 TABLET ORAL DAILY
Qty: 180 TABLET | Refills: 1 | Status: SHIPPED | OUTPATIENT
Start: 2024-12-17

## 2025-02-02 DIAGNOSIS — E78.00 HYPERCHOLESTEROLEMIA: ICD-10-CM

## 2025-02-03 RX ORDER — ICOSAPENT ETHYL 1000 MG/1
CAPSULE ORAL
Qty: 180 CAPSULE | Refills: 0 | Status: SHIPPED | OUTPATIENT
Start: 2025-02-03

## 2025-02-03 NOTE — TELEPHONE ENCOUNTER
Requested medication(s) are due for refill today: Yes  Patient has already received a courtesy refill: No  Other reason request has been forwarded to provider: PCP is out of office, are you able to refill?

## 2025-02-07 LAB
LEFT EYE DIABETIC RETINOPATHY: NORMAL
RIGHT EYE DIABETIC RETINOPATHY: NORMAL

## 2025-03-05 LAB

## 2025-03-09 DIAGNOSIS — E78.00 HYPERCHOLESTEROLEMIA: ICD-10-CM

## 2025-03-10 ENCOUNTER — TELEPHONE (OUTPATIENT)
Age: 50
End: 2025-03-10

## 2025-03-10 RX ORDER — ATORVASTATIN CALCIUM 10 MG/1
10 TABLET, FILM COATED ORAL DAILY
Qty: 90 TABLET | Refills: 0 | Status: SHIPPED | OUTPATIENT
Start: 2025-03-10

## 2025-03-10 NOTE — TELEPHONE ENCOUNTER
Kevin with [facility] [employer] called stating they received the script, OV notes and sleep study. He mentioned the sleep data needs diagnosis. Please fax to [phone].

## 2025-03-17 ENCOUNTER — TELEPHONE (OUTPATIENT)
Dept: FAMILY MEDICINE CLINIC | Facility: HOSPITAL | Age: 50
End: 2025-03-17

## 2025-03-17 NOTE — TELEPHONE ENCOUNTER
Left voicemail for patient - Dr. Stafford will not be in the office on Friday 3/21 and patient will need to change the day.

## 2025-03-18 NOTE — TELEPHONE ENCOUNTER
Left voicemail for patient to call office back to change appt. Asked patient to ask for me so I can put the patient in. I am adjusting the schedule so patients don't need to wait until May, Thanks!

## 2025-03-20 LAB

## 2025-03-26 ENCOUNTER — OFFICE VISIT (OUTPATIENT)
Dept: FAMILY MEDICINE CLINIC | Facility: HOSPITAL | Age: 50
End: 2025-03-26
Payer: COMMERCIAL

## 2025-03-26 VITALS
SYSTOLIC BLOOD PRESSURE: 134 MMHG | WEIGHT: 261.4 LBS | OXYGEN SATURATION: 97 % | DIASTOLIC BLOOD PRESSURE: 90 MMHG | HEIGHT: 71 IN | HEART RATE: 73 BPM | BODY MASS INDEX: 36.6 KG/M2

## 2025-03-26 DIAGNOSIS — E11.9 TYPE 2 DIABETES MELLITUS WITHOUT COMPLICATION, WITHOUT LONG-TERM CURRENT USE OF INSULIN (HCC): ICD-10-CM

## 2025-03-26 DIAGNOSIS — Z12.5 SCREENING PSA (PROSTATE SPECIFIC ANTIGEN): ICD-10-CM

## 2025-03-26 DIAGNOSIS — Z12.11 SCREENING FOR COLORECTAL CANCER: ICD-10-CM

## 2025-03-26 DIAGNOSIS — Z12.12 SCREENING FOR COLORECTAL CANCER: ICD-10-CM

## 2025-03-26 DIAGNOSIS — G47.33 OSA (OBSTRUCTIVE SLEEP APNEA): ICD-10-CM

## 2025-03-26 DIAGNOSIS — Z00.00 ANNUAL PHYSICAL EXAM: Primary | ICD-10-CM

## 2025-03-26 DIAGNOSIS — Z23 ENCOUNTER FOR IMMUNIZATION: ICD-10-CM

## 2025-03-26 LAB — SL AMB POCT HEMOGLOBIN AIC: 7 (ref ?–6.5)

## 2025-03-26 PROCEDURE — 90471 IMMUNIZATION ADMIN: CPT

## 2025-03-26 PROCEDURE — 90677 PCV20 VACCINE IM: CPT

## 2025-03-26 PROCEDURE — 90750 HZV VACC RECOMBINANT IM: CPT

## 2025-03-26 PROCEDURE — 99214 OFFICE O/P EST MOD 30 MIN: CPT | Performed by: FAMILY MEDICINE

## 2025-03-26 PROCEDURE — 83036 HEMOGLOBIN GLYCOSYLATED A1C: CPT | Performed by: FAMILY MEDICINE

## 2025-03-26 PROCEDURE — 99396 PREV VISIT EST AGE 40-64: CPT

## 2025-03-26 PROCEDURE — 90472 IMMUNIZATION ADMIN EACH ADD: CPT

## 2025-03-26 PROCEDURE — 90715 TDAP VACCINE 7 YRS/> IM: CPT

## 2025-03-26 RX ORDER — CETIRIZINE HYDROCHLORIDE 10 MG/1
10 TABLET, CHEWABLE ORAL DAILY
COMMUNITY

## 2025-03-26 NOTE — PROGRESS NOTES
Adult Annual Physical  Name: Kiel Chavira      : 1975      MRN: [id number]  Encounter Provider: Casa Hogue MD  Encounter Date: 3/26/2025   Encounter department: [facility] [address]     Assessment & Plan  Type 2 diabetes mellitus without complication, without long-term current use of insulin (HCC)    Lab Results   Component Value Date    HGBA1C 7.0 (A) 2025   Stable.   Target A1c <6.5.     On metformin.     Would benefit from mounjaro.   Comorbidity to include RADHA.     Discussed se/ar of ozempic.   No family history of MTC or personal hx of pancreatitis.      Start semaglutide 0.25 mg weekly and titrate as tolerated for benefit of weight loss.     Continue efforts with diet and exercise.         Recheck in 4 months.   Orders:    POCT hemoglobin A1c    semaglutide, 0.25 or 0.5 mg/dose, (Ozempic, 0.25 or 0.5 MG/DOSE,) 2 mg/3 mL injection pen; Inject 0.25 mg under the skin once weekly for 28 days, THEN inject 0.5 mg under the skin once weekly    CBC; Future    Comprehensive metabolic panel; Future    Lipid Panel with Direct LDL reflex; Future    Hemoglobin A1C; Future    TSH, 3rd generation with Free T4 reflex; Future    Albumin / creatinine urine ratio; Future    Annual physical exam         Screening for colorectal cancer    Orders:    Ambulatory referral to Gastroenterology; Future    Screening PSA (prostate specific antigen)    Orders:    PSA, Total Screen; Future    Encounter for immunization    Orders:    TDAP VACCINE GREATER THAN OR EQUAL TO 6YO IM    Zoster Vaccine Recombinant IM    Pneumococcal Conjugate Vaccine 20-valent (Pcv20)    RADHA (obstructive sleep apnea)  Stable. Start on mounjaro.          Preventive Screenings:  - Diabetes Screening: screening not indicated and has diabetes  - Cholesterol Screening: screening not indicated and has hyperlipidemia   - Hepatitis C screening: screening up-to-date   - HIV screening: screening not indicated   - Colon cancer  "screening: orders placed   - Lung cancer screening: screening not indicated   - Prostate cancer screening: screening up-to-date     Immunizations:  - Immunizations due: Influenza         History of Present Illness     Adult Annual Physical:  Patient presents for annual physical. Here for follow-up of chronic disease as well as a physical.  Known diabetic.  Working on diet and exercise.  Has been limited in the exercise area.  Working on this.  Asking about using Mounjaro or Ozempic again.  Somewhat worried about side effects.  But knowing he needs more help with weight loss to help with diabetes management overall.  Otherwise has been feeling well..     Diet and Physical Activity:  - Diet/Nutrition: diabetic diet.  - Exercise: no formal exercise. Working on making improvements    Depression Screening:  - PHQ-2 Score: 0    General Health:  - Sleep: uses CPAP machine.  - Hearing: normal hearing right ear and normal hearing left ear.  - Vision: no vision problems.  - Dental: regular dental visits.     Health:  - History of STDs: no.   - Urinary symptoms: none.     Review of Systems      Objective   /90   Pulse 73   Ht 5' 11\" (1.803 m)   Wt 119 kg (261 lb 6.4 oz)   SpO2 97%   BMI 36.46 kg/m²     Physical Exam  Cardiovascular:      Pulses: no weak pulses.           Dorsalis pedis pulses are 2+ on the right side and 2+ on the left side.        Posterior tibial pulses are 2+ on the right side and 2+ on the left side.   Feet:      Right foot:      Skin integrity: No ulcer, skin breakdown, erythema, warmth, callus or dry skin.      Left foot:      Skin integrity: No ulcer, skin breakdown, erythema, warmth, callus or dry skin.     Diabetic Foot Exam    Patient's shoes and socks removed.    Right Foot/Ankle   Right Foot Inspection  Skin Exam: skin normal and skin intact. No dry skin, no warmth, no callus, no erythema, no maceration, no abnormal color, no pre-ulcer, no ulcer and no callus.     Toe Exam: ROM and " strength within normal limits.     Sensory   Vibration: intact  Proprioception: intact  Monofilament testing: intact    Vascular  Capillary refills: < 3 seconds  The right DP pulse is 2+. The right PT pulse is 2+.     Left Foot/Ankle  Left Foot Inspection  Skin Exam: skin normal and skin intact. No dry skin, no warmth, no erythema, no maceration, normal color, no pre-ulcer, no ulcer and no callus.     Toe Exam: ROM and strength within normal limits.     Sensory   Vibration: intact  Proprioception: intact  Monofilament testing: intact    Vascular  Capillary refills: < 3 seconds  The left DP pulse is 2+. The left PT pulse is 2+.     Assign Risk Category  No deformity present  No loss of protective sensation  No weak pulses  Risk: 0

## 2025-03-26 NOTE — ASSESSMENT & PLAN NOTE
Lab Results   Component Value Date    HGBA1C 7.0 (A) 03/26/2025   Stable.   Target A1c <6.5.     On metformin.     Would benefit from mounjaro.   Comorbidity to include RADHA.     Discussed se/ar of ozempic.   No family history of MTC or personal hx of pancreatitis.      Start semaglutide 0.25 mg weekly and titrate as tolerated for benefit of weight loss.     Continue efforts with diet and exercise.         Recheck in 4 months.   Orders:    POCT hemoglobin A1c    semaglutide, 0.25 or 0.5 mg/dose, (Ozempic, 0.25 or 0.5 MG/DOSE,) 2 mg/3 mL injection pen; Inject 0.25 mg under the skin once weekly for 28 days, THEN inject 0.5 mg under the skin once weekly    CBC; Future    Comprehensive metabolic panel; Future    Lipid Panel with Direct LDL reflex; Future    Hemoglobin A1C; Future    TSH, 3rd generation with Free T4 reflex; Future    Albumin / creatinine urine ratio; Future

## 2025-03-26 NOTE — PATIENT INSTRUCTIONS
"Patient Education     Routine physical for adults   The Basics   Written by the doctors and editors at [facility]   What is a physical? -- A physical is a routine visit, or \"check-up,\" with your doctor. You might also hear it called a \"wellness visit\" or \"preventive visit.\"  During each visit, the doctor will:   Ask about your physical and mental health   Ask about your habits, behaviors, and lifestyle   Do an exam   Give you vaccines if needed   Talk to you about any medicines you take   Give advice about your health   Answer your questions  Getting regular check-ups is an important part of taking care of your health. It can help your doctor find and treat any problems you have. But it's also important for preventing health problems.  A routine physical is different from a \"sick visit.\" A sick visit is when you see a doctor because of a health concern or problem. Since physicals are scheduled ahead of time, you can think about what you want to ask the doctor.  How often should I get a physical? -- It depends on your age and health. In general, for people age 21 years and older:   If you are younger than 50 years, you might be able to get a physical every 3 years.   If you are 50 years or older, your doctor might recommend a physical every year.  If you have an ongoing health condition, like diabetes or high blood pressure, your doctor will probably want to see you more often.  What happens during a physical? -- In general, each visit will include:   Physical exam - The doctor or nurse will check your height, weight, heart rate, and blood pressure. They will also look at your eyes and ears. They will ask about how you are feeling and whether you have any symptoms that bother you.   Medicines - It's a good idea to bring a list of all the medicines you take to each doctor visit. Your doctor will talk to you about your medicines and answer any questions. Tell them if you are having any side effects that bother you. You " "should also tell them if you are having trouble paying for any of your medicines.   Habits and behaviors - This includes:   Your diet   Your exercise habits   Whether you smoke, drink alcohol, or use drugs   Whether you are sexually active   Whether you feel safe at home  Your doctor will talk to you about things you can do to improve your health and lower your risk of health problems. They will also offer help and support. For example, if you want to quit smoking, they can give you advice and might prescribe medicines. If you want to improve your diet or get more physical activity, they can help you with this, too.   Lab tests, if needed - The tests you get will depend on your age and situation. For example, your doctor might want to check your:   Cholesterol   Blood sugar   Iron level   Vaccines - The recommended vaccines will depend on your age, health, and what vaccines you already had. Vaccines are very important because they can prevent certain serious or deadly infections.   Discussion of screening - \"Screening\" means checking for diseases or other health problems before they cause symptoms. Your doctor can recommend screening based on your age, risk, and preferences. This might include tests to check for:   Cancer, such as breast, prostate, cervical, ovarian, colorectal, prostate, lung, or skin cancer   Sexually transmitted infections, such as chlamydia and gonorrhea   Mental health conditions like depression and anxiety  Your doctor will talk to you about the different types of screening tests. They can help you decide which screenings to have. They can also explain what the results might mean.   Answering questions - The physical is a good time to ask the doctor or nurse questions about your health. If needed, they can refer you to other doctors or specialists, too.  Adults older than 65 years often need other care, too. As you get older, your doctor will talk to you about:   How to prevent falling at " home   Hearing or vision tests   Memory testing   How to take your medicines safely   Making sure that you have the help and support you need at home  All topics are updated as new evidence becomes available and our peer review process is complete.  This topic retrieved from [employer] on: May 02, 2024.  Topic 646363 Version 1.0  Release: 32.4.3 - C32.122  © 2024 UpToDate, Inc. and/or its affiliates. All rights reserved.  Consumer Information Use and Disclaimer   Disclaimer: This generalized information is a limited summary of diagnosis, treatment, and/or medication information. It is not meant to be comprehensive and should be used as a tool to help the user understand and/or assess potential diagnostic and treatment options. It does NOT include all information about conditions, treatments, medications, side effects, or risks that may apply to a specific patient. It is not intended to be medical advice or a substitute for the medical advice, diagnosis, or treatment of a health care provider based on the health care provider's examination and assessment of a patient's specific and unique circumstances. Patients must speak with a health care provider for complete information about their health, medical questions, and treatment options, including any risks or benefits regarding use of medications. This information does not endorse any treatments or medications as safe, effective, or approved for treating a specific patient. UpToDate, Inc. and its affiliates disclaim any warranty or liability relating to this information or the use thereof.The use of this information is governed by the Terms of Use, available at https://www.woltersMarc[employer] Digital Solutionsuwer.com/en/know/clinical-effectiveness-terms. 2024© UpToDate, Inc. and its affiliates and/or licensors. All rights reserved.  Copyright   © 2024 UpToDate, Inc. and/or its affiliates. All rights reserved.    Patient Education     Routine physical for adults   The Basics   Written by the  "doctors and editors at [facility]   What is a physical? -- A physical is a routine visit, or \"check-up,\" with your doctor. You might also hear it called a \"wellness visit\" or \"preventive visit.\"  During each visit, the doctor will:   Ask about your physical and mental health   Ask about your habits, behaviors, and lifestyle   Do an exam   Give you vaccines if needed   Talk to you about any medicines you take   Give advice about your health   Answer your questions  Getting regular check-ups is an important part of taking care of your health. It can help your doctor find and treat any problems you have. But it's also important for preventing health problems.  A routine physical is different from a \"sick visit.\" A sick visit is when you see a doctor because of a health concern or problem. Since physicals are scheduled ahead of time, you can think about what you want to ask the doctor.  How often should I get a physical? -- It depends on your age and health. In general, for people age 21 years and older:   If you are younger than 50 years, you might be able to get a physical every 3 years.   If you are 50 years or older, your doctor might recommend a physical every year.  If you have an ongoing health condition, like diabetes or high blood pressure, your doctor will probably want to see you more often.  What happens during a physical? -- In general, each visit will include:   Physical exam - The doctor or nurse will check your height, weight, heart rate, and blood pressure. They will also look at your eyes and ears. They will ask about how you are feeling and whether you have any symptoms that bother you.   Medicines - It's a good idea to bring a list of all the medicines you take to each doctor visit. Your doctor will talk to you about your medicines and answer any questions. Tell them if you are having any side effects that bother you. You should also tell them if you are having trouble paying for any of your " "medicines.   Habits and behaviors - This includes:   Your diet   Your exercise habits   Whether you smoke, drink alcohol, or use drugs   Whether you are sexually active   Whether you feel safe at home  Your doctor will talk to you about things you can do to improve your health and lower your risk of health problems. They will also offer help and support. For example, if you want to quit smoking, they can give you advice and might prescribe medicines. If you want to improve your diet or get more physical activity, they can help you with this, too.   Lab tests, if needed - The tests you get will depend on your age and situation. For example, your doctor might want to check your:   Cholesterol   Blood sugar   Iron level   Vaccines - The recommended vaccines will depend on your age, health, and what vaccines you already had. Vaccines are very important because they can prevent certain serious or deadly infections.   Discussion of screening - \"Screening\" means checking for diseases or other health problems before they cause symptoms. Your doctor can recommend screening based on your age, risk, and preferences. This might include tests to check for:   Cancer, such as breast, prostate, cervical, ovarian, colorectal, prostate, lung, or skin cancer   Sexually transmitted infections, such as chlamydia and gonorrhea   Mental health conditions like depression and anxiety  Your doctor will talk to you about the different types of screening tests. They can help you decide which screenings to have. They can also explain what the results might mean.   Answering questions - The physical is a good time to ask the doctor or nurse questions about your health. If needed, they can refer you to other doctors or specialists, too.  Adults older than 65 years often need other care, too. As you get older, your doctor will talk to you about:   How to prevent falling at home   Hearing or vision tests   Memory testing   How to take your " medicines safely   Making sure that you have the help and support you need at home  All topics are updated as new evidence becomes available and our peer review process is complete.  This topic retrieved from [employer] on: May 02, 2024.  Topic 598429 Version 1.0  Release: 32.4.3 - C32.122  © 2024 UpToDate, Inc. and/or its affiliates. All rights reserved.  Consumer Information Use and Disclaimer   Disclaimer: This generalized information is a limited summary of diagnosis, treatment, and/or medication information. It is not meant to be comprehensive and should be used as a tool to help the user understand and/or assess potential diagnostic and treatment options. It does NOT include all information about conditions, treatments, medications, side effects, or risks that may apply to a specific patient. It is not intended to be medical advice or a substitute for the medical advice, diagnosis, or treatment of a health care provider based on the health care provider's examination and assessment of a patient's specific and unique circumstances. Patients must speak with a health care provider for complete information about their health, medical questions, and treatment options, including any risks or benefits regarding use of medications. This information does not endorse any treatments or medications as safe, effective, or approved for treating a specific patient. UpToDate, Inc. and its affiliates disclaim any warranty or liability relating to this information or the use thereof.The use of this information is governed by the Terms of Use, available at https://www.wolters[employer] Renovationsuwer.com/en/know/clinical-effectiveness-terms. 2024© UpToDate, Inc. and its affiliates and/or licensors. All rights reserved.  Copyright   © 2024 UpToDate, Inc. and/or its affiliates. All rights reserved.

## 2025-03-26 NOTE — PROGRESS NOTES
"Adult Annual Physical  Name: Kiel Chavira      : 1975      MRN: [id number]  Encounter Provider: Casa Hogue MD  Encounter Date: 3/26/2025   Encounter department: [facility] [address]     Assessment & Plan        Immunizations:  - Immunizations due: Influenza, Prevnar 20, Tdap and Zoster (Shingrix)         History of Present Illness     Adult Annual Physical:  Patient presents for annual physical.     Diet and Physical Activity:  - Diet/Nutrition: well balanced diet, portion control, frequent junk food, high fat diet and consuming 3-5 servings of fruits/vegetables daily.  - Exercise: walking, 3-4 times a week on average and 30-60 minutes on average.    Depression Screening:  - PHQ-2 Score: 0    General Health:  - Sleep: sleeps well, 7-8 hours of sleep on average and uses CPAP machine.  - Hearing: decreased hearing bilateral ears.  - Vision: no vision problems, most recent eye exam < 1 year ago and wears contacts.  - Dental: regular dental visits, brushes teeth once daily and floss regularly.     Health:  - History of STDs: no.   - Urinary symptoms: none.     Review of Systems      Objective   Ht 5' 11\" (1.803 m)   Wt 119 kg (261 lb 6.4 oz)   BMI 36.46 kg/m²     Physical Exam    "

## 2025-03-31 ENCOUNTER — TELEPHONE (OUTPATIENT)
Age: 50
End: 2025-03-31

## 2025-03-31 LAB
DME PARACHUTE DELIVERY DATE EXPECTED: NORMAL
DME PARACHUTE DELIVERY DATE REQUESTED: NORMAL
DME PARACHUTE ITEM DESCRIPTION: NORMAL
DME PARACHUTE ORDER STATUS: NORMAL
DME PARACHUTE SUPPLIER NAME: NORMAL
DME PARACHUTE SUPPLIER PHONE: NORMAL

## 2025-03-31 NOTE — TELEPHONE ENCOUNTER
PA for Ozempic has generated but the PA team needs the office note from 3/26/25 to be signed before we can proceed with the PA. Please let our team know when completed.

## 2025-04-02 NOTE — TELEPHONE ENCOUNTER
PA for (Ozempic, 0.25 or 0.5 MG/DOSE,) 2 mg/3 mL injection pen SUBMITTED to [facility]    via    []CMM-KEY:   [x]Surescripts-Case ID # [id number]  []Availity-Auth ID # NDC #   []Faxed to plan   []Other website   []Phone call Case ID #     [x]PA sent as URGENT    All office notes, labs and other pertaining documents and studies sent. Clinical questions answered. Awaiting determination from insurance company.     Turnaround time for your insurance to make a decision on your Prior Authorization can take 7-21 business days.

## 2025-04-03 NOTE — TELEPHONE ENCOUNTER
PA for (Ozempic, 0.25 or 0.5 MG/DOSE,) 2 mg/3 mL injection pen APPROVED     Date(s) approved 04/02/25-04/02/26    Case #[id number]    Patient advised by          []My Friend's [employer] Message  []Phone call   [x]LMOM  []L/M to call office as no active Communication consent on file  []Unable to leave detailed message as VM not approved on Communication consent       Pharmacy advised by    [x]Fax  []Phone call  []Secure Chat     Approval letter scanned into [employer] , not available at time of approval

## 2025-04-13 DIAGNOSIS — E11.9 TYPE 2 DIABETES MELLITUS WITHOUT COMPLICATION, WITHOUT LONG-TERM CURRENT USE OF INSULIN (HCC): ICD-10-CM

## 2025-04-14 RX ORDER — METFORMIN HYDROCHLORIDE 500 MG/1
TABLET, EXTENDED RELEASE ORAL
Qty: 180 TABLET | Refills: 1 | Status: SHIPPED | OUTPATIENT
Start: 2025-04-14

## 2025-04-22 ENCOUNTER — DOCUMENTATION (OUTPATIENT)
Dept: ADMINISTRATIVE | Facility: OTHER | Age: 50
End: 2025-04-22

## 2025-04-22 NOTE — PROGRESS NOTES
04/22/25 1:52 PM    CRC outreach is not required, there is an active CRC screening order.    Thank you.  Stephenie Jurado  [facility]

## 2025-05-13 ENCOUNTER — OFFICE VISIT (OUTPATIENT)
Dept: OBGYN CLINIC | Facility: CLINIC | Age: 50
End: 2025-05-13
Payer: COMMERCIAL

## 2025-05-13 ENCOUNTER — APPOINTMENT (OUTPATIENT)
Dept: RADIOLOGY | Age: 50
End: 2025-05-13
Attending: ORTHOPAEDIC SURGERY
Payer: COMMERCIAL

## 2025-05-13 VITALS — HEIGHT: 71 IN | WEIGHT: 257 LBS | BODY MASS INDEX: 35.98 KG/M2

## 2025-05-13 DIAGNOSIS — M25.551 PAIN IN RIGHT HIP: Primary | ICD-10-CM

## 2025-05-13 DIAGNOSIS — M25.551 PAIN IN RIGHT HIP: ICD-10-CM

## 2025-05-13 DIAGNOSIS — M16.11 PRIMARY OSTEOARTHRITIS OF RIGHT HIP: ICD-10-CM

## 2025-05-13 PROCEDURE — 99204 OFFICE O/P NEW MOD 45 MIN: CPT | Performed by: ORTHOPAEDIC SURGERY

## 2025-05-13 PROCEDURE — 73502 X-RAY EXAM HIP UNI 2-3 VIEWS: CPT

## 2025-05-13 RX ORDER — MELOXICAM 15 MG/1
15 TABLET ORAL DAILY
Qty: 30 TABLET | Refills: 1 | Status: SHIPPED | OUTPATIENT
Start: 2025-05-13

## 2025-05-13 NOTE — PROGRESS NOTES
Sports Medicine and Shoulder Surgery    Kiel Chavira, 50 y.o. male   MRN# [id number]   : 1975          CHIEF COMPLAINT/REASON FOR VISIT  Chief Complaint   Patient presents with    Right Hip - Pain     No injury, pain for about 2 months but has had pain before         HISTORY OF PRESENT ILLNESS  Kiel Chavira is a 50 y.o. male who presents for evaluation of his right HIP. Pain has been present for several months. Denies any acute injury or traumas. Pain is described to mostly in the right groin and over the lateral aspect of the hip. He was previously was evaluated and treated by [facility] in [place] for KIRILL. He describes mild hip pain after prolonged walking. He is currently taking Naproxen without any relief. 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     REVIEW OF SYSTEMS  Review of systems was performed and, outside that mentioned in the HPI, it was negative for symptomology related to the integumentary, hematologic, immunologic, allergic, neurologic, cardiovascular, respiratory, GI or  systems.    MEDICAL HISTORY  Patient Active Problem List   Diagnosis    Gout    Hypercholesterolemia    Premature atrial contractions    Seasonal allergies    Nephrolithiasis    Adrenal nodule (HCC)    Type 2 diabetes mellitus without complication, without long-term current use of insulin (HCC)    RADHA (obstructive sleep apnea)    Hematospermia    Flank pain    Obesity, Class II, BMI 35-39.9       SURGICAL HISTORY  Past Surgical History:   Procedure Laterality Date    URETERAL STENT PLACEMENT      WISDOM TOOTH EXTRACTION         CURRENT MEDICATIONS    Current Outpatient Medications:     allopurinol (ZYLOPRIM) 100 mg tablet, TAKE TWO TABLETS BY MOUTH DAILY, Disp: 180 tablet, Rfl: 1    ASPIRIN 81 PO, , Disp: , Rfl:     atorvastatin (LIPITOR) 10 mg tablet, Take 1 tablet (10 mg total) by mouth daily, Disp: 90 tablet, Rfl: 0    cetirizine (ZyrTEC) 10 MG chewable tablet, Chew 10 mg daily, Disp: , Rfl:      Fluticasone Propionate (FLONASE NA), into each nostril, Disp: , Rfl:     metFORMIN (GLUCOPHAGE-XR) 500 mg 24 hr tablet, TAKE TWO TABLETS BY MOUTH DAILY WITH DINNER, Disp: 180 tablet, Rfl: 1    Multiple Vitamins-Minerals (MULTIVITAMIN MEN PO), , Disp: , Rfl:     semaglutide, 0.25 or 0.5 mg/dose, (Ozempic, 0.25 or 0.5 MG/DOSE,) 2 mg/3 mL injection pen, Inject 0.25 mg under the skin once weekly for 28 days, THEN inject 0.5 mg under the skin once weekly, Disp: 9 mL, Rfl: 0    Vascepa 1 g CAPS, Take 2 capsules (2 g total) by mouth 2 (two) times a day (Patient taking differently: Take 1 g by mouth daily Pt takes 2 capsules at one time), Disp: 180 capsule, Rfl: 0    SOCIAL HISTORY  Social History     Socioeconomic History    Marital status: /Civil Union     Spouse name: Not on file    Number of children: Not on file    Years of education: Not on file    Highest education level: Not on file   Occupational History    Not on file   Tobacco Use    Smoking status: Never    Smokeless tobacco: Never   Vaping Use    Vaping status: Never Used   Substance and Sexual Activity    Alcohol use: Yes     Comment: social drinker    Drug use: Never    Sexual activity: Yes     Partners: Female   Other Topics Concern    Not on file   Social History Narrative    Not on file     Social Drivers of Health     Financial Resource Strain: Not on file   Food Insecurity: No Food Insecurity (3/26/2025)    Hunger Vital Sign     Worried About Running Out of Food in the Last Year: Never true     Ran Out of Food in the Last Year: Never true   Transportation Needs: No Transportation Needs (3/26/2025)    PRAPARE - Transportation     Lack of Transportation (Medical): No     Lack of Transportation (Non-Medical): No   Physical Activity: Not on file   Stress: Not on file   Social Connections: Not on file   Intimate Partner Violence: Not on file   Housing Stability: Low Risk  (3/26/2025)    Housing Stability Vital Sign     Unable to Pay for Housing in the  "Last Year: No     Number of Times Moved in the Last Year: 0     Homeless in the Last Year: No       Objective     VITAL SIGNS  Ht 5' 11\" (1.803 m)   Wt 117 kg (257 lb)   BMI 35.84 kg/m²        PHYSICAL EXAMINATION    right hip:   Skin- no discoloration, wounds or gross deformity   No limb length discrepancy  Limp positioned normally.  No dislocation/deformity  Gait: normal  Tenderness to palpation: Lateral and Groin  ROM: Decreased internal rotation  +FADIR TEST  Fina test: negative  Jake's test:  negative  AT/GS intact  Full strength and sensory intact to light touch throughout extremity       RADIOGRAPHIC EXAMINATION/DIAGNOSTICS:  No results found.  Independent interpretation of the right hip radiographs demonstrate no acute fracture or dislocation.  No joint effusion. Mild/moderate degenerative changes.  Soft tissues unremarkable.      Assessment & Plan  Pain in right hip    Orders:    XR hip/pelv 2-3 vws right if performed; Future    Primary osteoarthritis of right hip  Signs and symptoms are consistent with Primary OA vs femoral acetabular impingement vs Labral tear   -Referral to physical therapy was provided  -Mobic 15mg prescribed  -Discussed that he can reach for an MRI if pain does not subside  -Please call with any questions or concerns.     Orders:    Ambulatory Referral to Physical Therapy; Future             Scribe Attestation      I,:  Kj Corona am acting as a scribe while in the presence of the attending physician.:       I,:  Tone Diaz MD personally performed the services described in this documentation    as scribed in my presence.:             "

## 2025-06-03 ENCOUNTER — EVALUATION (OUTPATIENT)
Dept: PHYSICAL THERAPY | Facility: CLINIC | Age: 50
End: 2025-06-03
Attending: ORTHOPAEDIC SURGERY
Payer: COMMERCIAL

## 2025-06-03 DIAGNOSIS — M25.551 RIGHT HIP PAIN: Primary | ICD-10-CM

## 2025-06-03 PROCEDURE — 97161 PT EVAL LOW COMPLEX 20 MIN: CPT | Performed by: PHYSICAL THERAPIST

## 2025-06-03 NOTE — PROGRESS NOTES
PT Evaluation     Today's date: 6/3/2025  Patient name: Kiel Chavira  : 1975  MRN: [id number]  Referring provider: Tone Diaz MD  Dx:   Encounter Diagnosis     ICD-10-CM    1. Right hip pain  M25.551                      Assessment  Impairments: abnormal gait, abnormal or restricted ROM, impaired physical strength, lacks appropriate home exercise program and pain with function  Symptom irritability: low    Assessment details: Pt is a 50 y.o. male presenting to PT with chronic R hip pain. PT findings include: gross R hip ROM deficits, positive hip special tests (+ scour, + KIRILL), strength deficits of hip stabilizers, inf joint hypomobility. Findings are consistent with diagnosis, likely more impingement as pain generator. Pt educated on PT findings, anatomy, biomechanics, POC and rehab course. Pt will benefit from skilled PT to address above impairments to return to PLOF with less restriction and to reach functional goals.   Understanding of Dx/Px/POC: good     Prognosis: good    Goals  1. Pt will demonstrate understanding of HEP and POC in order to improve compliance with course of rehab in 2 weeks.  2. Pt's hip ROM will improve to WNL allowing pt to perform all transfers with less restriction by d/c.   3. Pt's hip abduction/ext MMT will improve to 5/5 to improve stability with single leg activities by dc.  4. Pt's pain will be 2/10 or less to allow pt to return to PLOF with least restriction by d/c.     Plan  Patient would benefit from: skilled physical therapy    Planned therapy interventions: joint mobilization, manual therapy, strengthening, stretching, therapeutic exercise, therapeutic training, therapeutic activities, home exercise program, flexibility and functional ROM exercises    Frequency: 2x week  Duration in weeks: 4  Treatment plan discussed with: patient    Subjective Evaluation    History of Present Illness  Mechanism of injury: Pt presents to PT via referral from orthopedic surgery for  chronic R hip pain. Pt with imaging that revealed mild/moderate degenerative changes. He reports prior history of pain in the R hip treated for femoral impingement.      Pain is localized to the R groin, over the last week he has noted improvement in pain. He reports aggravating activities involve prolonged WB, walking, stair climbing. Pt has trialed naproxen prn.     Pt was prescribed meloxicam which pt has not started.         Patient Goals  Patient goals for therapy: decreased pain, increased motion, increased strength and independence with ADLs/IADLs    Pain  Current pain rating: 3  At worst pain ratin  Location: R hip  Quality: dull ache, discomfort and tight      Objective    Flowsheet Rows      Flowsheet Row Most Recent Value   PT/OT G-Codes    Current Score 79   Projected Score 78          Hip ROM:  Flex: 100°/100° (groin pain recreation B)  ABD: 55° B  ER: 50°  IR: 45°    Strength:  Hip abd: 3+/5; 3+/5  Hip ER: 4/5; 4/5  Hip ext: 4/5; 4/5    Special tests:  Femoral impingement: positive B  RYAN: negative B  Scour: positive B    Functional observation:  SLS: no contralateral hip drop  Squat: heel lifts, decreased hip flexion (pt avoiding, end range hip flexion likely due to KIRILL)  Lunge: slight dynamic knee valgus, minimal contralateral hip drop  Single leg STS: mild dynamic knee valgus R; moderate contralateral hip drop and knee valgus on L    Palpation: negative tenderness glute med/max, greater trochanter, IT band    Joint mobility:  Hip Inf: hypo B       Precautions: None      Manuals 6/3            Hip inf glide/ distraction W/ belt                                                   Neuro Re-Ed                                                                                                        Ther Ex                          Bridge HEP            S/l hip abduction HEP            S/l clamshell Grn HEP            Quadruped fire hydrant HEP            Lateral band walk                                                                  Ther Activity             Bike             Squat             U/l RDL             U/l sit to stand             Split squat UE support prn            Gait Training                                       Modalities

## 2025-06-05 ENCOUNTER — APPOINTMENT (OUTPATIENT)
Dept: PHYSICAL THERAPY | Facility: CLINIC | Age: 50
End: 2025-06-05
Attending: ORTHOPAEDIC SURGERY
Payer: COMMERCIAL

## 2025-06-10 ENCOUNTER — OFFICE VISIT (OUTPATIENT)
Dept: PHYSICAL THERAPY | Facility: CLINIC | Age: 50
End: 2025-06-10
Attending: ORTHOPAEDIC SURGERY
Payer: COMMERCIAL

## 2025-06-10 DIAGNOSIS — M25.551 RIGHT HIP PAIN: Primary | ICD-10-CM

## 2025-06-10 PROCEDURE — 97110 THERAPEUTIC EXERCISES: CPT | Performed by: PHYSICAL THERAPIST

## 2025-06-10 PROCEDURE — 97530 THERAPEUTIC ACTIVITIES: CPT | Performed by: PHYSICAL THERAPIST

## 2025-06-10 PROCEDURE — 97140 MANUAL THERAPY 1/> REGIONS: CPT | Performed by: PHYSICAL THERAPIST

## 2025-06-10 NOTE — PROGRESS NOTES
Daily Note     Today's date: 6/10/2025  Patient name: Kiel Chavira  : 1975  MRN: [id number]  Referring provider: Tone Diaz MD  Dx:   Encounter Diagnosis     ICD-10-CM    1. Right hip pain  M25.551                      Subjective: Pt reports that he did okay with his exercises. He does report he may have overexerted himself with exercises and felt discomfort in the R lateral hip (pointing to greater trochanter)      Objective: See treatment diary below      Assessment: Performed exercises as listed per chart with fatigue noted in the hip abductors/glute med. Pt was educated that we do want appropriate rest days to avoid developing greater trochanteric bursitis/gluteal tendinopathy. We would like appropriate muscle adaptations with slow progressions. Concluded with tiger tail to glute med to facilitate soft tissue mobility. Pt will benefit from continued skilled PT.       Plan: Continue per plan of care.      Precautions: None      Manuals 6/3 6/10             Hip inf glide/ distraction W/ belt            tigertail  Glute in s/l DL                                     Neuro Re-Ed                                                                                                        Ther Ex                          Bridge HEP Pball 2x10           S/l hip abduction HEP 2x10 ea.            S/l clamshell Grn HEP            Quadruped fire hydrant HEP            Lateral band walk  Red 4 laps                                                               Ther Activity             Bike  5'           Squat  Blk band @ knee 2x10           U/l RDL  10# 2x10 ea.            U/l sit to stand             Split squat UE support prn            Gait Training                                       Modalities

## 2025-06-12 ENCOUNTER — OFFICE VISIT (OUTPATIENT)
Dept: PHYSICAL THERAPY | Facility: CLINIC | Age: 50
End: 2025-06-12
Attending: ORTHOPAEDIC SURGERY
Payer: COMMERCIAL

## 2025-06-12 DIAGNOSIS — M25.551 RIGHT HIP PAIN: Primary | ICD-10-CM

## 2025-06-12 PROCEDURE — 97110 THERAPEUTIC EXERCISES: CPT | Performed by: PHYSICAL THERAPIST

## 2025-06-12 PROCEDURE — 97140 MANUAL THERAPY 1/> REGIONS: CPT | Performed by: PHYSICAL THERAPIST

## 2025-06-12 PROCEDURE — 97530 THERAPEUTIC ACTIVITIES: CPT | Performed by: PHYSICAL THERAPIST

## 2025-06-17 ENCOUNTER — APPOINTMENT (OUTPATIENT)
Dept: PHYSICAL THERAPY | Facility: CLINIC | Age: 50
End: 2025-06-17
Attending: ORTHOPAEDIC SURGERY
Payer: COMMERCIAL

## 2025-06-19 ENCOUNTER — APPOINTMENT (OUTPATIENT)
Dept: PHYSICAL THERAPY | Facility: CLINIC | Age: 50
End: 2025-06-19
Attending: ORTHOPAEDIC SURGERY
Payer: COMMERCIAL

## 2025-06-24 ENCOUNTER — OFFICE VISIT (OUTPATIENT)
Dept: PHYSICAL THERAPY | Facility: CLINIC | Age: 50
End: 2025-06-24
Attending: ORTHOPAEDIC SURGERY
Payer: COMMERCIAL

## 2025-06-24 DIAGNOSIS — M25.551 RIGHT HIP PAIN: Primary | ICD-10-CM

## 2025-06-24 PROCEDURE — 97530 THERAPEUTIC ACTIVITIES: CPT

## 2025-06-24 PROCEDURE — 97140 MANUAL THERAPY 1/> REGIONS: CPT

## 2025-06-24 PROCEDURE — 97110 THERAPEUTIC EXERCISES: CPT

## 2025-06-24 NOTE — PROGRESS NOTES
"Daily Note     Today's date: 2025  Patient name: Kiel Chavira  : 1975  MRN: [id number]  Referring provider: Tone Diaz MD  Dx:   Encounter Diagnosis     ICD-10-CM    1. Right hip pain  M25.551                      Subjective: Pt returns from vacation with some increased discomfort. Notes walking on the beach irritated his R hip, and he did not perform his HEP.      Objective: See treatment diary below      Assessment: Performed exercise program w/mild discomfort at times. Responded well to tiger tail STM. Relief after tx. Tolerated treatment well. Will monitor. Patient would benefit from continued PT to decrease pain, increase mobility , strength and LOF.      Plan: Continue per plan of care.      Precautions: None      Manuals 6/3 6/10 6/12 6/24           Hip inf glide/ distraction W/ belt  DL DL         tigertail  Glute in s/l DL Glute in s/l DL Glute in s/l  MO         R hip PROM                          Neuro Re-Ed                                                                                                        Ther Ex                          Bridge HEP Pball 2x10 Pball knees bent 2x5 Pball knees bent 2x5         S/l hip abduction HEP 2x10 ea.  2x10 ea.  2x10 ea         S/l clamshell Grn HEP            Quadruped fire hydrant HEP  Standing Red 2x10 ea.  Standing red 2x10 ea         Lateral band walk  Red 4 laps Red 4 laps Red 4 laps         SLS   30\"x3 airex  30\"x3 airex         HS curl   5# 3x10 ea.  5# 3x10 ea Trial 10# 3x10 ea.                                   Ther Activity             Bike  5' 5' 5'         Squat  Blk band @ knee 2x10           U/l RDL  10# 2x10 ea.  10# 2x10 ea.  10#  2x10   ea         U/l sit to stand             Split squat UE support prn            Gait Training                                       Modalities                                                       "

## 2025-06-26 ENCOUNTER — OFFICE VISIT (OUTPATIENT)
Dept: PHYSICAL THERAPY | Facility: CLINIC | Age: 50
End: 2025-06-26
Attending: ORTHOPAEDIC SURGERY
Payer: COMMERCIAL

## 2025-06-26 DIAGNOSIS — M25.551 RIGHT HIP PAIN: Primary | ICD-10-CM

## 2025-06-26 PROCEDURE — 97530 THERAPEUTIC ACTIVITIES: CPT | Performed by: PHYSICAL THERAPIST

## 2025-06-26 PROCEDURE — 97110 THERAPEUTIC EXERCISES: CPT | Performed by: PHYSICAL THERAPIST

## 2025-06-26 NOTE — PROGRESS NOTES
"Daily Note     Today's date: 2025  Patient name: Kiel Chavira  : 1975  MRN: [id number]  Referring provider: Tone Diaz MD  Dx:   Encounter Diagnosis     ICD-10-CM    1. Right hip pain  M25.551                      Subjective: Pt reports that his soreness has improved back to baseline. Denies significant pain upon arrival to PT.       Objective: See treatment diary below      Assessment: Pt tolerates treatment well today, continuing to fatigue with hip stabilization exercises. Added hip hike to further load the glute med and leg press to bias glute max for posterior chain strengthening. Will continue to progress per tolerance to maximize function.       Plan: Continue per plan of care.      Precautions: None      Manuals 6/3 6/10 6/12 6/24 6/26          Hip inf glide/ distraction W/ belt  DL DL DL        tigertail  Glute in s/l DL Glute in s/l DL Glute in s/l  MO Glute in s/l DL        R hip PROM                          Neuro Re-Ed                                                                                                        Ther Ex                          Bridge HEP Pball 2x10 Pball knees bent 2x5 Pball knees bent 2x5 Pball knees bent 2x5         S/l hip abduction HEP 2x10 ea.  2x10 ea.  2x10 ea 2x10         Hip adduction sq.      15x5\"        S/l clamshell Grn HEP            Quadruped fire hydrant HEP  Standing Red 2x10 ea.  Standing red 2x10 ea         Lateral band walk  Red 4 laps Red 4 laps Red 4 laps Red 4 laps        SLS   30\"x3 airex  30\"x3 airex 30\"x3 airex        HS curl   5# 3x10 ea.  5# 3x10 ea Trial 10# 3x10 ea.         R hip hike     10x5\"        Leg Press     u/l 30# 10x ea glute bias         Ther Activity             Bike  5' 5' 5' 5'        Squat  Blk band @ knee 2x10           U/l RDL  10# 2x10 ea.  10# 2x10 ea.  10#  2x10   ea 10# 2x10 ea        U/l sit to stand             Split squat UE support prn            Gait Training                                     "   Modalities

## 2025-07-01 ENCOUNTER — OFFICE VISIT (OUTPATIENT)
Dept: PHYSICAL THERAPY | Facility: CLINIC | Age: 50
End: 2025-07-01
Attending: ORTHOPAEDIC SURGERY
Payer: COMMERCIAL

## 2025-07-01 DIAGNOSIS — M25.551 RIGHT HIP PAIN: Primary | ICD-10-CM

## 2025-07-01 PROCEDURE — 97140 MANUAL THERAPY 1/> REGIONS: CPT

## 2025-07-01 PROCEDURE — 97530 THERAPEUTIC ACTIVITIES: CPT

## 2025-07-01 PROCEDURE — 97110 THERAPEUTIC EXERCISES: CPT

## 2025-07-01 NOTE — PROGRESS NOTES
"Daily Note     Today's date: 2025  Patient name: Kiel Chavira  : 1975  MRN: [id number]  Referring provider: Tone Diaz MD  Dx:   Encounter Diagnosis     ICD-10-CM    1. Right hip pain  M25.551                      Subjective: Pt reports muscle soreness after LV. Notes his R hip is feeling better.      Objective: See treatment diary below      Assessment: Performed exercise program w/o complaint. Demonstrates good knowledge of same. Responded well to manual therapies as below. Tolerated treatment well. Will monitor. Patient would benefit from continued PT to decrease pain, increase strength, stability and LOF.      Plan: Continue per plan of care.      Precautions: None      Manuals 6/3 6/10 6/12 6/24 6/26 7/1         Hip inf glide/ distraction W/ belt  DL DL DL DL       tigertail  Glute in s/l DL Glute in s/l DL Glute in s/l  MO Glute in s/l DL Glute in s/l  MO       R hip PROM                          Neuro Re-Ed                                                                                                        Ther Ex                          Bridge HEP Pball 2x10 Pball knees bent 2x5 Pball knees bent 2x5 Pball knees bent 2x5  Pball knees bent 2x5       S/l hip abduction HEP 2x10 ea.  2x10 ea.  2x10 ea 2x10  2x10  ea       Hip adduction sq.      15x5\" 15x5\"       S/l clamshell Grn HEP            Quadruped fire hydrant HEP  Standing Red 2x10 ea.  Standing red 2x10 ea         Lateral band walk  Red 4 laps Red 4 laps Red 4 laps Red 4 laps Red 4laps       SLS   30\"x3 airex  30\"x3 airex 30\"x3 airex 30\"x3 airex       HS curl   5# 3x10 ea.  5# 3x10 ea Trial 10# 3x10 ea.  10# 3x10 ea       R hip hike     10x5\" 10x5\"       Leg Press     u/l 30# 10x ea glute bias  U/l 30#   10x ea glute bias       Ther Activity             Bike  5' 5' 5' 5' 6'       Squat  Blk band @ knee 2x10           U/l RDL  10# 2x10 ea.  10# 2x10 ea.  10#  2x10   ea 10# 2x10 ea 10# 2x10 ea       U/l sit to stand             Split " squat UE support prn            Gait Training                                       Modalities

## 2025-07-08 ENCOUNTER — OFFICE VISIT (OUTPATIENT)
Dept: PHYSICAL THERAPY | Facility: CLINIC | Age: 50
End: 2025-07-08
Attending: ORTHOPAEDIC SURGERY
Payer: COMMERCIAL

## 2025-07-08 DIAGNOSIS — M25.551 RIGHT HIP PAIN: Primary | ICD-10-CM

## 2025-07-08 PROCEDURE — 97110 THERAPEUTIC EXERCISES: CPT | Performed by: PHYSICAL THERAPIST

## 2025-07-08 NOTE — PROGRESS NOTES
"Daily Note     Today's date: 2025  Patient name: Kiel Chavira  : 1975  MRN: [id number]  Referring provider: Tone Diaz MD  Dx:   Encounter Diagnosis     ICD-10-CM    1. Right hip pain  M25.551                      Subjective: Pt reports his hip is feeling much better overall.      Objective: See treatment diary below      Assessment: Pt tolerated progressions very well today. Progressions with multiple exercises, educated if sore. Continue to progress as able.      Plan: Continue per plan of care.      Precautions: None      Manuals 6/3 6/10 6/12 6/24 6/26 7/1 78        Hip inf glide/ distraction W/ belt  DL DL DL DL FH      tigertail  Glute in s/l DL Glute in s/l DL Glute in s/l  MO Glute in s/l DL Glute in s/l  MO       R hip PROM                          Neuro Re-Ed                                                                                                        Ther Ex                          Bridge HEP Pball 2x10 Pball knees bent 2x5 Pball knees bent 2x5 Pball knees bent 2x5  Pball knees bent 2x5 Pball knees bent 3x5      S/l hip abduction HEP 2x10 ea.  2x10 ea.  2x10 ea 2x10  2x10  ea 2x10ea      Hip adduction sq.      15x5\" 15x5\" Hip ADD 10x      S/l clamshell Grn HEP            Quadruped fire hydrant HEP  Standing Red 2x10 ea.  Standing red 2x10 ea         Lateral band walk  Red 4 laps Red 4 laps Red 4 laps Red 4 laps Red 4laps Green 4 laps      SLS   30\"x3 airex  30\"x3 airex 30\"x3 airex 30\"x3 airex 3x30'' foam      HS curl   5# 3x10 ea.  5# 3x10 ea Trial 10# 3x10 ea.  10# 3x10 ea 3x10ea 10#      R hip hike     10x5\" 10x5\" 10x5''      Leg Press     u/l 30# 10x ea glute bias  U/l 30#   10x ea glute bias U/l 30#   2x10 ea glute bias      Ther Activity             Bike  5' 5' 5' 5' 6' 8' L2      Squat  Blk band @ knee 2x10           U/l RDL  10# 2x10 ea.  10# 2x10 ea.  10#  2x10   ea 10# 2x10 ea 10# 2x10 ea 2x10 ea 10#      U/l sit to stand             Split squat UE support prn         "    Gait Training                                       Modalities

## 2025-07-09 DIAGNOSIS — E78.00 HYPERCHOLESTEROLEMIA: ICD-10-CM

## 2025-07-10 ENCOUNTER — OFFICE VISIT (OUTPATIENT)
Dept: PHYSICAL THERAPY | Facility: CLINIC | Age: 50
End: 2025-07-10
Attending: ORTHOPAEDIC SURGERY
Payer: COMMERCIAL

## 2025-07-10 DIAGNOSIS — M25.551 RIGHT HIP PAIN: Primary | ICD-10-CM

## 2025-07-10 PROCEDURE — 97140 MANUAL THERAPY 1/> REGIONS: CPT

## 2025-07-10 PROCEDURE — 97110 THERAPEUTIC EXERCISES: CPT

## 2025-07-10 RX ORDER — ICOSAPENT ETHYL 1 G/1
2 CAPSULE ORAL 2 TIMES DAILY
Qty: 400 CAPSULE | Refills: 3 | Status: SHIPPED | OUTPATIENT
Start: 2025-07-10 | End: 2026-08-14

## 2025-07-10 NOTE — PROGRESS NOTES
"Daily Note     Today's date: 7/10/2025  Patient name: Kiel Chavira  : 1975  MRN: [id number]  Referring provider: Tone Diaz MD  Dx:   Encounter Diagnosis     ICD-10-CM    1. Right hip pain  M25.551           Start Time: 1700  Stop Time: 1742  Total time in clinic (min): 42 minutes    Subjective: Patient reports some muscle soreness since his program was progressed last session.       Objective: See treatment diary below      Assessment: Patient is tolerating outlined treatment plan. Demonstrated appropriate fatigue and challenge with current exercise prescription without eliciting any hip pain. Less gluteal tension after utilizing tiger tail roller. Displayed greater challenge with S/l hip add on Lt. Patient would benefit from continued PT to improve level of function.       Plan: Continue per POC. Increase reps/ressitance as tolerated.      Precautions: None      Manuals 6/3 6/10 6/12 6/24 6/26 7/ 78 7/10     Hip inf glide/ distraction W/ belt  DL DL DL DL FH      tigertail  Glute in s/l DL Glute in s/l DL Glute in s/l  MO Glute in s/l DL Glute in s/l  MO  Glute in s/l  MS     R hip PROM                          Neuro Re-Ed                                                                                                        Ther Ex                          Bridge HEP Pball 2x10 Pball knees bent 2x5 Pball knees bent 2x5 Pball knees bent 2x5  Pball knees bent 2x5 Pball knees bent 3x5 Pball knees bent 3x5     S/l hip abduction HEP 2x10 ea.  2x10 ea.  2x10 ea 2x10  2x10  ea 2x10ea 2x10ea     Hip adduction sq.      15x5\" 15x5\" Hip ADD 10x Hip ADD 10x     S/l clamshell Grn HEP            Quadruped fire hydrant HEP  Standing Red 2x10 ea.  Standing red 2x10 ea         Lateral band walk  Red 4 laps Red 4 laps Red 4 laps Red 4 laps Red 4laps Green 4 laps Green 4 laps     SLS   30\"x3 airex  30\"x3 airex 30\"x3 airex 30\"x3 airex 3x30'' foam 3x30'' foam     HS curl   5# 3x10 ea.  5# 3x10 ea Trial 10# 3x10 ea.  " "10# 3x10 ea 3x10ea 10#      R hip hike     10x5\" 10x5\" 10x5'' 10x5''     Leg Press     u/l 30# 10x ea glute bias  U/l 30#   10x ea glute bias U/l 30#   2x10 ea glute bias U/l 30#   2x10 ea glute bias     Ther Activity             Bike  5' 5' 5' 5' 6' 8' L2 8' L2     Squat  Blk band @ knee 2x10           U/l RDL  10# 2x10 ea.  10# 2x10 ea.  10#  2x10   ea 10# 2x10 ea 10# 2x10 ea 2x10 ea 10# 2x10 ea 10#     U/l sit to stand             Split squat UE support prn            Gait Training                                       Modalities                                                               "

## 2025-07-15 ENCOUNTER — APPOINTMENT (OUTPATIENT)
Dept: PHYSICAL THERAPY | Facility: CLINIC | Age: 50
End: 2025-07-15
Attending: ORTHOPAEDIC SURGERY
Payer: COMMERCIAL

## 2025-07-17 ENCOUNTER — OFFICE VISIT (OUTPATIENT)
Dept: PHYSICAL THERAPY | Facility: CLINIC | Age: 50
End: 2025-07-17
Attending: ORTHOPAEDIC SURGERY
Payer: COMMERCIAL

## 2025-07-17 DIAGNOSIS — M25.551 RIGHT HIP PAIN: Primary | ICD-10-CM

## 2025-07-17 PROCEDURE — 97530 THERAPEUTIC ACTIVITIES: CPT | Performed by: PHYSICAL THERAPIST

## 2025-07-17 PROCEDURE — 97140 MANUAL THERAPY 1/> REGIONS: CPT | Performed by: PHYSICAL THERAPIST

## 2025-07-17 PROCEDURE — 97110 THERAPEUTIC EXERCISES: CPT | Performed by: PHYSICAL THERAPIST

## 2025-07-17 NOTE — PROGRESS NOTES
"Daily Note     Today's date: 2025  Patient name: Kiel Chavira  : 1975  MRN: [id number]  Referring provider: Tone Diaz MD  Dx:   Encounter Diagnosis     ICD-10-CM    1. Right hip pain  M25.551                      Subjective: Pt reports that the hip joint has been relatively calm, no major flare ups with walking. He has however been noticing more glute tenderness localized that is mostly constant.       Objective: See treatment diary below      Assessment: Pt tolerates exercises well, held on hip adduction raise due to hip impingement and irritation to greater trochanter. Performed manual glute med release pin/stretch which alleviated much of his glute soreness. Pt educated that he can trial foam roller to address glute soreness. Pt will benefit from continued skilled PT.       Plan: Continue per plan of care.      Precautions: None      Manuals 6/3 6/10 6/12 6/24 6/26 7/1 78 7/10 7/17    Hip inf glide/ distraction W/ belt  DL DL DL DL FH      tigertail  Glute in s/l DL Glute in s/l DL Glute in s/l  MO Glute in s/l DL Glute in s/l  MO  Glute in s/l  MS Glute in s/l; prone pin/stretch L glute med     R hip PROM                          Neuro Re-Ed                                                                                                        Ther Ex                          Bridge HEP Pball 2x10 Pball knees bent 2x5 Pball knees bent 2x5 Pball knees bent 2x5  Pball knees bent 2x5 Pball knees bent 3x5 Pball knees bent 3x5 15x5\" pball     Bridge march         10x ea.     S/l hip abduction HEP 2x10 ea.  2x10 ea.  2x10 ea 2x10  2x10  ea 2x10ea 2x10ea     Hip adduction sq.      15x5\" 15x5\" Hip ADD 10x Hip ADD 10x Hold     S/l clamshell Grn HEP            Quadruped fire hydrant HEP  Standing Red 2x10 ea.  Standing red 2x10 ea         Lateral band walk  Red 4 laps Red 4 laps Red 4 laps Red 4 laps Red 4laps Green 4 laps Green 4 laps Green 4 laps    SLS   30\"x3 airex  30\"x3 airex 30\"x3 airex 30\"x3 " "airex 3x30'' foam 3x30'' foam     HS curl   5# 3x10 ea.  5# 3x10 ea Trial 10# 3x10 ea.  10# 3x10 ea 3x10ea 10#      R hip hike     10x5\" 10x5\" 10x5'' 10x5'' 10x10\"    Leg Press     u/l 30# 10x ea glute bias  U/l 30#   10x ea glute bias U/l 30#   2x10 ea glute bias U/l 30#   2x10 ea glute bias U/l 40# 3x10 ea.     Ther Activity             Bike  5' 5' 5' 5' 6' 8' L2 8' L2 8' L2    Squat  Blk band @ knee 2x10           U/l RDL  10# 2x10 ea.  10# 2x10 ea.  10#  2x10   ea 10# 2x10 ea 10# 2x10 ea 2x10 ea 10# 2x10 ea 10# 3x10 15#     U/l sit to stand             Split squat UE support prn        Trial NV, glute bias, UE support     Gait Training                                       Modalities                                                                 "

## 2025-07-22 ENCOUNTER — OFFICE VISIT (OUTPATIENT)
Dept: PHYSICAL THERAPY | Facility: CLINIC | Age: 50
End: 2025-07-22
Attending: ORTHOPAEDIC SURGERY
Payer: COMMERCIAL

## 2025-07-22 DIAGNOSIS — M25.551 RIGHT HIP PAIN: Primary | ICD-10-CM

## 2025-07-22 PROCEDURE — 97110 THERAPEUTIC EXERCISES: CPT

## 2025-07-22 NOTE — PROGRESS NOTES
"Daily Note     Today's date: 2025  Patient name: Kiel Chavira  : 1975  MRN: [id number]  Referring provider: Tone Diaz MD  Dx:   Encounter Diagnosis     ICD-10-CM    1. Right hip pain  M25.551           Start Time:   Stop Time:   Total time in clinic (min): 44 minutes      Subjective: Patient reports he did a lot of walking over the weekend and his right hip is feeling sore.      Objective: See treatment diary below.      Assessment: Fatigue noted with progression of TE program.  Right hip and glute are tight therefore encouraged patient to perform self stretching at home.      Plan: Continue treatment as per PT plan of care.       Precautions: None      Manuals 6/3 6/10 6/12 6/24 6/26 7/1 78 7/10 7/17 7/22   Hip inf glide/ distraction W/ belt  DL DL DL DL FH      tigertail  Glute in s/l DL Glute in s/l DL Glute in s/l  MO Glute in s/l DL Glute in s/l  MO  Glute in s/l  MS Glute in s/l; prone pin/stretch L glute med  R glute in s/l  JLW   Glute stretch          JLW   R hip PROM                                       Neuro Re-Ed                                                                                                        Ther Ex                          Bridge HEP Pball 2x10 Pball knees bent 2x5 Pball knees bent 2x5 Pball knees bent 2x5  Pball knees bent 2x5 Pball knees bent 3x5 Pball knees bent 3x5 15x5\" pball  pball knees bent  5\"x15   Bridge march         10x ea.     S/l hip abduction HEP 2x10 ea.  2x10 ea.  2x10 ea 2x10  2x10  ea 2x10ea 2x10ea     Hip adduction sq.      15x5\" 15x5\" Hip ADD 10x Hip ADD 10x Hold     S/l clamshell Grn HEP            Quadruped fire hydrant HEP  Standing Red 2x10 ea.  Standing red 2x10 ea         Lateral band walk  Red 4 laps Red 4 laps Red 4 laps Red 4 laps Red 4laps Green 4 laps Green 4 laps Green 4 laps blue  4 laps   SLS   30\"x3 airex  30\"x3 airex 30\"x3 airex 30\"x3 airex 3x30'' foam 3x30'' foam     HS curl   5# 3x10 ea.  5# 3x10 ea Trial 10# " "3x10 ea.  10# 3x10 ea 3x10ea 10#      R hip hike     10x5\" 10x5\" 10x5'' 10x5'' 10x10\" 10\"x10 ea   Leg Press     u/l 30# 10x ea glute bias  U/l 30#   10x ea glute bias U/l 30#   2x10 ea glute bias U/l 30#   2x10 ea glute bias U/l 40# 3x10 ea.  U/l 40# 3x10 ea                Ther Activity             Bike  5' 5' 5' 5' 6' 8' L2 8' L2 8' L2 8' L2   Squat  Blk band @ knee 2x10           U/l RDL  10# 2x10 ea.  10# 2x10 ea.  10#  2x10   ea 10# 2x10 ea 10# 2x10 ea 2x10 ea 10# 2x10 ea 10# 3x10 15#   3x10 ea   15# kb   U/l sit to stand             Split squat UE support prn        Trial NV, glute bias, UE support   15 ea   Gait Training                                       Modalities                                                 "

## 2025-07-23 ENCOUNTER — APPOINTMENT (OUTPATIENT)
Dept: LAB | Facility: CLINIC | Age: 50
End: 2025-07-23
Payer: COMMERCIAL

## 2025-07-23 DIAGNOSIS — E11.9 TYPE 2 DIABETES MELLITUS WITHOUT COMPLICATION, WITHOUT LONG-TERM CURRENT USE OF INSULIN (HCC): ICD-10-CM

## 2025-07-23 DIAGNOSIS — Z12.5 SCREENING PSA (PROSTATE SPECIFIC ANTIGEN): ICD-10-CM

## 2025-07-23 LAB
ALBUMIN SERPL BCG-MCNC: 4.4 G/DL (ref 3.5–5)
ALP SERPL-CCNC: 41 U/L (ref 34–104)
ALT SERPL W P-5'-P-CCNC: 68 U/L (ref 7–52)
ANION GAP SERPL CALCULATED.3IONS-SCNC: 8 MMOL/L (ref 4–13)
AST SERPL W P-5'-P-CCNC: 36 U/L (ref 13–39)
BILIRUB SERPL-MCNC: 0.78 MG/DL (ref 0.2–1)
BUN SERPL-MCNC: 15 MG/DL (ref 5–25)
CALCIUM SERPL-MCNC: 9.1 MG/DL (ref 8.4–10.2)
CHLORIDE SERPL-SCNC: 103 MMOL/L (ref 96–108)
CHOLEST SERPL-MCNC: 124 MG/DL (ref ?–200)
CO2 SERPL-SCNC: 29 MMOL/L (ref 21–32)
CREAT SERPL-MCNC: 1.06 MG/DL (ref 0.6–1.3)
CREAT UR-MCNC: 238.7 MG/DL
ERYTHROCYTE [DISTWIDTH] IN BLOOD BY AUTOMATED COUNT: 12.7 % (ref 11.6–15.1)
EST. AVERAGE GLUCOSE BLD GHB EST-MCNC: 131 MG/DL
GFR SERPL CREATININE-BSD FRML MDRD: 80 ML/MIN/1.73SQ M
GLUCOSE P FAST SERPL-MCNC: 107 MG/DL (ref 65–99)
HBA1C MFR BLD: 6.2 %
HCT VFR BLD AUTO: 48.6 % (ref 36.5–49.3)
HDLC SERPL-MCNC: 40 MG/DL
HGB BLD-MCNC: 16.2 G/DL (ref 12–17)
LDLC SERPL CALC-MCNC: 59 MG/DL (ref 0–100)
MCH RBC QN AUTO: 30.9 PG (ref 26.8–34.3)
MCHC RBC AUTO-ENTMCNC: 33.3 G/DL (ref 31.4–37.4)
MCV RBC AUTO: 93 FL (ref 82–98)
MICROALBUMIN UR-MCNC: 17.8 MG/L
MICROALBUMIN/CREAT 24H UR: 7 MG/G CREATININE (ref 0–30)
PLATELET # BLD AUTO: 281 THOUSANDS/UL (ref 149–390)
PMV BLD AUTO: 10.3 FL (ref 8.9–12.7)
POTASSIUM SERPL-SCNC: 3.9 MMOL/L (ref 3.5–5.3)
PROT SERPL-MCNC: 7.2 G/DL (ref 6.4–8.4)
PSA SERPL-MCNC: 2.93 NG/ML (ref 0–4)
RBC # BLD AUTO: 5.24 MILLION/UL (ref 3.88–5.62)
SODIUM SERPL-SCNC: 140 MMOL/L (ref 135–147)
TRIGL SERPL-MCNC: 125 MG/DL (ref ?–150)
TSH SERPL DL<=0.05 MIU/L-ACNC: 2.15 UIU/ML (ref 0.45–4.5)
WBC # BLD AUTO: 6.91 THOUSAND/UL (ref 4.31–10.16)

## 2025-07-23 PROCEDURE — 83036 HEMOGLOBIN GLYCOSYLATED A1C: CPT

## 2025-07-23 PROCEDURE — 80061 LIPID PANEL: CPT

## 2025-07-23 PROCEDURE — 82570 ASSAY OF URINE CREATININE: CPT

## 2025-07-23 PROCEDURE — 82043 UR ALBUMIN QUANTITATIVE: CPT

## 2025-07-23 PROCEDURE — 80053 COMPREHEN METABOLIC PANEL: CPT

## 2025-07-23 PROCEDURE — 84443 ASSAY THYROID STIM HORMONE: CPT

## 2025-07-23 PROCEDURE — 85027 COMPLETE CBC AUTOMATED: CPT

## 2025-07-23 PROCEDURE — G0103 PSA SCREENING: HCPCS

## 2025-07-23 PROCEDURE — 36415 COLL VENOUS BLD VENIPUNCTURE: CPT

## 2025-07-24 ENCOUNTER — APPOINTMENT (OUTPATIENT)
Dept: PHYSICAL THERAPY | Facility: CLINIC | Age: 50
End: 2025-07-24
Attending: ORTHOPAEDIC SURGERY
Payer: COMMERCIAL

## 2025-07-29 ENCOUNTER — OFFICE VISIT (OUTPATIENT)
Dept: PHYSICAL THERAPY | Facility: CLINIC | Age: 50
End: 2025-07-29
Attending: ORTHOPAEDIC SURGERY
Payer: COMMERCIAL

## 2025-07-29 DIAGNOSIS — M25.551 RIGHT HIP PAIN: Primary | ICD-10-CM

## 2025-07-29 PROCEDURE — 97110 THERAPEUTIC EXERCISES: CPT | Performed by: PHYSICAL THERAPIST

## 2025-07-29 PROCEDURE — 97140 MANUAL THERAPY 1/> REGIONS: CPT | Performed by: PHYSICAL THERAPIST

## 2025-07-30 ENCOUNTER — OFFICE VISIT (OUTPATIENT)
Dept: FAMILY MEDICINE CLINIC | Facility: HOSPITAL | Age: 50
End: 2025-07-30
Payer: COMMERCIAL

## 2025-07-30 VITALS
OXYGEN SATURATION: 94 % | HEIGHT: 71 IN | SYSTOLIC BLOOD PRESSURE: 118 MMHG | HEART RATE: 89 BPM | BODY MASS INDEX: 36.23 KG/M2 | WEIGHT: 258.8 LBS | DIASTOLIC BLOOD PRESSURE: 70 MMHG

## 2025-07-30 DIAGNOSIS — E78.00 HYPERCHOLESTEROLEMIA: ICD-10-CM

## 2025-07-30 DIAGNOSIS — M10.9 GOUT, UNSPECIFIED CAUSE, UNSPECIFIED CHRONICITY, UNSPECIFIED SITE: ICD-10-CM

## 2025-07-30 DIAGNOSIS — E11.9 TYPE 2 DIABETES MELLITUS WITHOUT COMPLICATION, WITHOUT LONG-TERM CURRENT USE OF INSULIN (HCC): Primary | ICD-10-CM

## 2025-07-30 DIAGNOSIS — G47.33 OSA (OBSTRUCTIVE SLEEP APNEA): ICD-10-CM

## 2025-07-30 PROCEDURE — 99214 OFFICE O/P EST MOD 30 MIN: CPT | Performed by: FAMILY MEDICINE

## 2025-07-30 RX ORDER — ALLOPURINOL 100 MG/1
100 TABLET ORAL DAILY
Start: 2025-07-30

## 2025-07-31 ENCOUNTER — APPOINTMENT (OUTPATIENT)
Dept: PHYSICAL THERAPY | Facility: CLINIC | Age: 50
End: 2025-07-31
Attending: ORTHOPAEDIC SURGERY
Payer: COMMERCIAL

## 2025-08-01 DIAGNOSIS — M16.11 PRIMARY OSTEOARTHRITIS OF RIGHT HIP: Primary | ICD-10-CM
